# Patient Record
Sex: FEMALE | Race: WHITE | NOT HISPANIC OR LATINO
[De-identification: names, ages, dates, MRNs, and addresses within clinical notes are randomized per-mention and may not be internally consistent; named-entity substitution may affect disease eponyms.]

---

## 2017-02-01 ENCOUNTER — RX RENEWAL (OUTPATIENT)
Age: 62
End: 2017-02-01

## 2017-02-13 ENCOUNTER — NON-APPOINTMENT (OUTPATIENT)
Age: 62
End: 2017-02-13

## 2017-02-13 ENCOUNTER — APPOINTMENT (OUTPATIENT)
Dept: CARDIOLOGY | Facility: CLINIC | Age: 62
End: 2017-02-13

## 2017-02-13 VITALS
WEIGHT: 165 LBS | BODY MASS INDEX: 28.17 KG/M2 | HEIGHT: 64 IN | SYSTOLIC BLOOD PRESSURE: 144 MMHG | DIASTOLIC BLOOD PRESSURE: 75 MMHG

## 2017-02-13 VITALS
HEIGHT: 64 IN | BODY MASS INDEX: 28.17 KG/M2 | HEART RATE: 70 BPM | RESPIRATION RATE: 15 BRPM | WEIGHT: 165 LBS | OXYGEN SATURATION: 98 % | SYSTOLIC BLOOD PRESSURE: 144 MMHG | DIASTOLIC BLOOD PRESSURE: 75 MMHG

## 2017-04-19 ENCOUNTER — RX RENEWAL (OUTPATIENT)
Age: 62
End: 2017-04-19

## 2017-06-12 ENCOUNTER — APPOINTMENT (OUTPATIENT)
Dept: CARDIOLOGY | Facility: CLINIC | Age: 62
End: 2017-06-12

## 2017-06-12 ENCOUNTER — NON-APPOINTMENT (OUTPATIENT)
Age: 62
End: 2017-06-12

## 2017-06-12 VITALS
SYSTOLIC BLOOD PRESSURE: 128 MMHG | OXYGEN SATURATION: 97 % | HEART RATE: 67 BPM | BODY MASS INDEX: 28.34 KG/M2 | WEIGHT: 166 LBS | HEIGHT: 64 IN | DIASTOLIC BLOOD PRESSURE: 81 MMHG

## 2017-08-04 ENCOUNTER — OTHER (OUTPATIENT)
Age: 62
End: 2017-08-04

## 2017-08-14 ENCOUNTER — OUTPATIENT (OUTPATIENT)
Dept: OUTPATIENT SERVICES | Facility: HOSPITAL | Age: 62
LOS: 1 days | End: 2017-08-14
Payer: COMMERCIAL

## 2017-08-14 ENCOUNTER — APPOINTMENT (OUTPATIENT)
Dept: CV DIAGNOSITCS | Facility: HOSPITAL | Age: 62
End: 2017-08-14

## 2017-08-14 DIAGNOSIS — I25.10 ATHEROSCLEROTIC HEART DISEASE OF NATIVE CORONARY ARTERY WITHOUT ANGINA PECTORIS: ICD-10-CM

## 2017-08-14 PROCEDURE — 93306 TTE W/DOPPLER COMPLETE: CPT

## 2017-08-14 PROCEDURE — 93306 TTE W/DOPPLER COMPLETE: CPT | Mod: 26

## 2017-10-16 ENCOUNTER — APPOINTMENT (OUTPATIENT)
Dept: CARDIOLOGY | Facility: CLINIC | Age: 62
End: 2017-10-16
Payer: COMMERCIAL

## 2017-10-16 ENCOUNTER — NON-APPOINTMENT (OUTPATIENT)
Age: 62
End: 2017-10-16

## 2017-10-16 VITALS
HEIGHT: 64 IN | HEART RATE: 81 BPM | BODY MASS INDEX: 28.34 KG/M2 | WEIGHT: 166 LBS | SYSTOLIC BLOOD PRESSURE: 126 MMHG | DIASTOLIC BLOOD PRESSURE: 72 MMHG | OXYGEN SATURATION: 99 %

## 2017-10-16 PROCEDURE — 99215 OFFICE O/P EST HI 40 MIN: CPT

## 2017-10-16 PROCEDURE — 93000 ELECTROCARDIOGRAM COMPLETE: CPT

## 2018-02-12 ENCOUNTER — NON-APPOINTMENT (OUTPATIENT)
Age: 63
End: 2018-02-12

## 2018-02-12 ENCOUNTER — APPOINTMENT (OUTPATIENT)
Dept: CARDIOLOGY | Facility: CLINIC | Age: 63
End: 2018-02-12
Payer: COMMERCIAL

## 2018-02-12 VITALS — SYSTOLIC BLOOD PRESSURE: 134 MMHG | DIASTOLIC BLOOD PRESSURE: 82 MMHG

## 2018-02-12 VITALS
BODY MASS INDEX: 28.34 KG/M2 | SYSTOLIC BLOOD PRESSURE: 136 MMHG | DIASTOLIC BLOOD PRESSURE: 91 MMHG | WEIGHT: 166 LBS | HEIGHT: 64 IN | HEART RATE: 64 BPM | OXYGEN SATURATION: 97 %

## 2018-02-12 VITALS — SYSTOLIC BLOOD PRESSURE: 132 MMHG | DIASTOLIC BLOOD PRESSURE: 85 MMHG

## 2018-02-12 PROCEDURE — 99215 OFFICE O/P EST HI 40 MIN: CPT

## 2018-02-12 PROCEDURE — 93000 ELECTROCARDIOGRAM COMPLETE: CPT

## 2018-07-16 ENCOUNTER — NON-APPOINTMENT (OUTPATIENT)
Age: 63
End: 2018-07-16

## 2018-07-16 ENCOUNTER — APPOINTMENT (OUTPATIENT)
Dept: CARDIOLOGY | Facility: CLINIC | Age: 63
End: 2018-07-16
Payer: COMMERCIAL

## 2018-07-16 VITALS — SYSTOLIC BLOOD PRESSURE: 157 MMHG | DIASTOLIC BLOOD PRESSURE: 93 MMHG

## 2018-07-16 VITALS — DIASTOLIC BLOOD PRESSURE: 85 MMHG | SYSTOLIC BLOOD PRESSURE: 153 MMHG

## 2018-07-16 VITALS — HEART RATE: 64 BPM | SYSTOLIC BLOOD PRESSURE: 156 MMHG | DIASTOLIC BLOOD PRESSURE: 82 MMHG | OXYGEN SATURATION: 98 %

## 2018-07-16 PROCEDURE — 93000 ELECTROCARDIOGRAM COMPLETE: CPT

## 2018-07-16 PROCEDURE — 99215 OFFICE O/P EST HI 40 MIN: CPT

## 2018-07-16 RX ORDER — GLIPIZIDE 5 MG/1
5 TABLET ORAL DAILY
Refills: 0 | Status: DISCONTINUED | COMMUNITY
Start: 2018-07-16 | End: 2018-07-16

## 2018-10-09 ENCOUNTER — RX RENEWAL (OUTPATIENT)
Age: 63
End: 2018-10-09

## 2018-11-12 ENCOUNTER — RX RENEWAL (OUTPATIENT)
Age: 63
End: 2018-11-12

## 2018-11-19 ENCOUNTER — NON-APPOINTMENT (OUTPATIENT)
Age: 63
End: 2018-11-19

## 2018-11-19 ENCOUNTER — APPOINTMENT (OUTPATIENT)
Dept: CARDIOLOGY | Facility: CLINIC | Age: 63
End: 2018-11-19
Payer: COMMERCIAL

## 2018-11-19 VITALS
HEART RATE: 65 BPM | OXYGEN SATURATION: 97 % | SYSTOLIC BLOOD PRESSURE: 147 MMHG | BODY MASS INDEX: 25.92 KG/M2 | DIASTOLIC BLOOD PRESSURE: 76 MMHG | WEIGHT: 151 LBS

## 2018-11-19 VITALS — DIASTOLIC BLOOD PRESSURE: 80 MMHG | SYSTOLIC BLOOD PRESSURE: 145 MMHG

## 2018-11-19 VITALS — SYSTOLIC BLOOD PRESSURE: 144 MMHG | DIASTOLIC BLOOD PRESSURE: 74 MMHG

## 2018-11-19 PROCEDURE — 99214 OFFICE O/P EST MOD 30 MIN: CPT

## 2018-11-19 PROCEDURE — 93000 ELECTROCARDIOGRAM COMPLETE: CPT

## 2018-11-19 RX ORDER — SITAGLIPTIN AND METFORMIN HYDROCHLORIDE 50; 500 MG/1; MG/1
50-500 TABLET, FILM COATED ORAL TWICE DAILY
Refills: 0 | Status: ACTIVE | COMMUNITY
Start: 2018-11-19

## 2018-11-19 RX ORDER — LISINOPRIL 5 MG/1
5 TABLET ORAL
Qty: 90 | Refills: 3 | Status: DISCONTINUED | COMMUNITY
Start: 2018-10-09 | End: 2018-11-19

## 2018-11-19 NOTE — DISCUSSION/SUMMARY
[Coronary Artery Disease] : coronary artery disease [Hyperlipidemia] : hyperlipidemia [Stable] : stable [None] : none [Essential Hypertension] : essential hypertension [Improving] : improving [Medication Changes Per Orders] : as documented in orders [Exercise Regimen] : an exercise regimen [Sodium Restriction] : sodium restriction [___ Month(s)] : [unfilled] month(s) [FreeTextEntry1] : remote MI without cardiovascular sx. reviewed blood work with pt.. slight increase in triglyceride and hgba1c.  spoke with pt about diet and exercise. take statin daily.\par no other cardiovascular events.\par \par 2/12/2018  remote MI without recurrent sx.  BP trend is a little elevated if remains borderline will increase lisinopril on next visit,\par \par 7/16/2018  no cardiac sx.  BP has gone up and will titrate lisinopril to 10 mg.  lipids in control.  needs to get diabetes under better control.\par \par 11/19/2018  diabetic medication changed.  BP is improving since last visit.  may consider change from metoprolol to Coreg on next visit to assist with bp management,.

## 2018-11-19 NOTE — HISTORY OF PRESENT ILLNESS
[FreeTextEntry1] : remote MI with elevated cholesterol and diabetes.  lipids and diabetes have been controlled and patient remains without cardiovasc sx.  no active complaints.\par walking daily\par \par pt told by PCP that sugar and cholesterol have been up recently.  she was skipping diabetes med and statin because she thought they were causing tinnitis.\par \par tchol  151\par LDL  75\par triglyc 214\par HDL  33\par \par [id number],4\par \par \par 6/12  no acute change in sx.  says that if she rushes or hurries she gets some mild chest discomfort  which resolves the moment she slows down.  \par \par 10/16/2017  no complaints today.  no chest pain, last echo consistent with prior studies mild segmental dysfunction.  reviewed bloods with patient.  \par \par 2/12/2018  no recent or acute sx.  echo reviewed. \par \par 7/16/2018 no new cardiac issues.  no chest pain, palpitations.  sugar has been high and was started on glipizide.  lipids are good except triglyceride.  explained need for diabetic control.\par \par 11/19/2018  pt saw endocrinologist and was started on Janumet instead of glipizide.  has been taking since July..  calling now to get blood work results.  no chest pain, sob, dyspnea.

## 2018-11-19 NOTE — PHYSICAL EXAM
[General Appearance - Well Developed] : well developed [Normal Appearance] : normal appearance [Well Groomed] : well groomed [General Appearance - Well Nourished] : well nourished [No Deformities] : no deformities [General Appearance - In No Acute Distress] : no acute distress [Normal Conjunctiva] : the conjunctiva exhibited no abnormalities [Eyelids - No Xanthelasma] : the eyelids demonstrated no xanthelasmas [Normal Oral Mucosa] : normal oral mucosa [No Oral Pallor] : no oral pallor [No Oral Cyanosis] : no oral cyanosis [Normal Jugular Venous A Waves Present] : normal jugular venous A waves present [Normal Jugular Venous V Waves Present] : normal jugular venous V waves present [No Jugular Venous Beasley A Waves] : no jugular venous beasley A waves [Respiration, Rhythm And Depth] : normal respiratory rhythm and effort [Exaggerated Use Of Accessory Muscles For Inspiration] : no accessory muscle use [Auscultation Breath Sounds / Voice Sounds] : lungs were clear to auscultation bilaterally [Normal] : normal [Normal Rate] : normal [Rhythm Regular] : regular [Normal S1] : normal S1 [Normal S2] : normal S2 [No Murmur] : no murmurs heard [No Abnormalities] : the abdominal aorta was not enlarged and no bruit was heard [No Pitting Edema] : no pitting edema present [Bowel Sounds] : normal bowel sounds [Abdomen Soft] : soft [Abdomen Tenderness] : non-tender [Abdomen Mass (___ Cm)] : no abdominal mass palpated [Abnormal Walk] : normal gait [Gait - Sufficient For Exercise Testing] : the gait was sufficient for exercise testing [Nail Clubbing] : no clubbing of the fingernails [Cyanosis, Localized] : no localized cyanosis [Petechial Hemorrhages (___cm)] : no petechial hemorrhages [Skin Color & Pigmentation] : normal skin color and pigmentation [] : no rash [No Venous Stasis] : no venous stasis [Skin Lesions] : no skin lesions [No Skin Ulcers] : no skin ulcer [No Xanthoma] : no  xanthoma was observed [Oriented To Time, Place, And Person] : oriented to person, place, and time [Affect] : the affect was normal [Mood] : the mood was normal [No Anxiety] : not feeling anxious [Right Carotid Bruit] : no bruit heard over the right carotid [Left Carotid Bruit] : no bruit heard over the left carotid [Bruit] : no bruit heard [Rt] : no varicose veins of the right leg [Lt] : no varicose veins of the left leg

## 2018-11-28 ENCOUNTER — MEDICATION RENEWAL (OUTPATIENT)
Age: 63
End: 2018-11-28

## 2019-03-25 ENCOUNTER — NON-APPOINTMENT (OUTPATIENT)
Age: 64
End: 2019-03-25

## 2019-03-25 ENCOUNTER — APPOINTMENT (OUTPATIENT)
Dept: CARDIOLOGY | Facility: CLINIC | Age: 64
End: 2019-03-25
Payer: COMMERCIAL

## 2019-03-25 VITALS — SYSTOLIC BLOOD PRESSURE: 114 MMHG | DIASTOLIC BLOOD PRESSURE: 75 MMHG

## 2019-03-25 VITALS — SYSTOLIC BLOOD PRESSURE: 129 MMHG | HEART RATE: 53 BPM | OXYGEN SATURATION: 97 % | DIASTOLIC BLOOD PRESSURE: 72 MMHG

## 2019-03-25 PROCEDURE — 93000 ELECTROCARDIOGRAM COMPLETE: CPT

## 2019-03-25 PROCEDURE — 99214 OFFICE O/P EST MOD 30 MIN: CPT

## 2019-03-25 NOTE — HISTORY OF PRESENT ILLNESS
[FreeTextEntry1] : remote MI with elevated cholesterol and diabetes.  lipids and diabetes have been controlled and patient remains without cardiovasc sx.  no active complaints.\par walking daily\par \par pt told by PCP that sugar and cholesterol have been up recently.  she was skipping diabetes med and statin because she thought they were causing tinnitis.\par \par tchol  151\par LDL  75\par triglyc 214\par HDL  33\par \par [id number],4\par \par \par 6/12  no acute change in sx.  says that if she rushes or hurries she gets some mild chest discomfort  which resolves the moment she slows down.  \par \par 10/16/2017  no complaints today.  no chest pain, last echo consistent with prior studies mild segmental dysfunction.  reviewed bloods with patient.  \par \par 2/12/2018  no recent or acute sx.  echo reviewed. \par \par 7/16/2018 no new cardiac issues.  no chest pain, palpitations.  sugar has been high and was started on glipizide.  lipids are good except triglyceride.  explained need for diabetic control.\par \par 11/19/2018  pt saw endocrinologist and was started on Janumet instead of glipizide.  has been taking since July..  calling now to get blood work results.  no chest pain, sob, dyspnea.\par \par 3/25/2019   had work up because of weight loss (20 lbs) workup was normal.  no chest pain.  no new cardiac sx.

## 2019-03-25 NOTE — DISCUSSION/SUMMARY
[Coronary Artery Disease] : coronary artery disease [Stable] : stable [Essential Hypertension] : essential hypertension [Improving] : improving [None] : none [___ Month(s)] : [unfilled] month(s) [FreeTextEntry1] : remote MI without cardiovascular sx. reviewed blood work with pt.. slight increase in triglyceride and hgba1c.  spoke with pt about diet and exercise. take statin daily.\par no other cardiovascular events.\par \par 2/12/2018  remote MI without recurrent sx.  BP trend is a little elevated if remains borderline will increase lisinopril on next visit,\par \par 7/16/2018  no cardiac sx.  BP has gone up and will titrate lisinopril to 10 mg.  lipids in control.  needs to get diabetes under better control.\par \par 11/19/2018  diabetic medication changed.  BP is improving since last visit.  may consider change from metoprolol to Coreg on next visit to assist with bp management,.\par \par 3/25/2019  BP well controlled. diabetic control is better.  no new medical issues.  maintain current tx,.  remote MI with stent.  htn and hyperlipidemia.

## 2019-03-25 NOTE — PHYSICAL EXAM
[General Appearance - Well Developed] : well developed [Normal Appearance] : normal appearance [Well Groomed] : well groomed [General Appearance - Well Nourished] : well nourished [No Deformities] : no deformities [General Appearance - In No Acute Distress] : no acute distress [Normal Conjunctiva] : the conjunctiva exhibited no abnormalities [Eyelids - No Xanthelasma] : the eyelids demonstrated no xanthelasmas [Normal Oral Mucosa] : normal oral mucosa [No Oral Pallor] : no oral pallor [No Oral Cyanosis] : no oral cyanosis [Normal Jugular Venous A Waves Present] : normal jugular venous A waves present [Normal Jugular Venous V Waves Present] : normal jugular venous V waves present [No Jugular Venous Beasley A Waves] : no jugular venous beasley A waves [Respiration, Rhythm And Depth] : normal respiratory rhythm and effort [Exaggerated Use Of Accessory Muscles For Inspiration] : no accessory muscle use [Auscultation Breath Sounds / Voice Sounds] : lungs were clear to auscultation bilaterally [Normal] : normal [Normal Rate] : normal [Rhythm Regular] : regular [Normal S1] : normal S1 [Normal S2] : normal S2 [No Murmur] : no murmurs heard [Right Carotid Bruit] : no bruit heard over the right carotid [Left Carotid Bruit] : no bruit heard over the left carotid [No Abnormalities] : the abdominal aorta was not enlarged and no bruit was heard [Bruit] : no bruit heard [No Pitting Edema] : no pitting edema present [Rt] : no varicose veins of the right leg [Lt] : no varicose veins of the left leg [Bowel Sounds] : normal bowel sounds [Abdomen Soft] : soft [Abdomen Tenderness] : non-tender [Abdomen Mass (___ Cm)] : no abdominal mass palpated [Abnormal Walk] : normal gait [Gait - Sufficient For Exercise Testing] : the gait was sufficient for exercise testing [Nail Clubbing] : no clubbing of the fingernails [Cyanosis, Localized] : no localized cyanosis [Petechial Hemorrhages (___cm)] : no petechial hemorrhages [Skin Color & Pigmentation] : normal skin color and pigmentation [] : no rash [No Venous Stasis] : no venous stasis [Skin Lesions] : no skin lesions [No Skin Ulcers] : no skin ulcer [No Xanthoma] : no  xanthoma was observed [Oriented To Time, Place, And Person] : oriented to person, place, and time [Affect] : the affect was normal [Mood] : the mood was normal [No Anxiety] : not feeling anxious

## 2019-07-31 ENCOUNTER — RX RENEWAL (OUTPATIENT)
Age: 64
End: 2019-07-31

## 2019-08-26 ENCOUNTER — APPOINTMENT (OUTPATIENT)
Dept: CARDIOLOGY | Facility: CLINIC | Age: 64
End: 2019-08-26

## 2019-09-30 ENCOUNTER — RX RENEWAL (OUTPATIENT)
Age: 64
End: 2019-09-30

## 2019-11-11 ENCOUNTER — NON-APPOINTMENT (OUTPATIENT)
Age: 64
End: 2019-11-11

## 2019-11-11 ENCOUNTER — APPOINTMENT (OUTPATIENT)
Dept: CARDIOLOGY | Facility: CLINIC | Age: 64
End: 2019-11-11
Payer: COMMERCIAL

## 2019-11-11 VITALS
DIASTOLIC BLOOD PRESSURE: 71 MMHG | WEIGHT: 138 LBS | SYSTOLIC BLOOD PRESSURE: 132 MMHG | OXYGEN SATURATION: 97 % | HEIGHT: 64 IN | BODY MASS INDEX: 23.56 KG/M2 | HEART RATE: 63 BPM

## 2019-11-11 DIAGNOSIS — E87.5 HYPERKALEMIA: ICD-10-CM

## 2019-11-11 PROCEDURE — 93000 ELECTROCARDIOGRAM COMPLETE: CPT

## 2019-11-11 PROCEDURE — 99214 OFFICE O/P EST MOD 30 MIN: CPT

## 2019-11-11 NOTE — DISCUSSION/SUMMARY
[Coronary Artery Disease] : coronary artery disease [Hyperlipidemia] : hyperlipidemia [None] : none [Stable] : stable [FreeTextEntry4] : aortic regurg [___ Month(s)] : [unfilled] month(s) [FreeTextEntry1] : remote MI without cardiovascular sx. reviewed blood work with pt.. slight increase in triglyceride and hgba1c.  spoke with pt about diet and exercise. take statin daily.\par no other cardiovascular events.\par \par 2/12/2018  remote MI without recurrent sx.  BP trend is a little elevated if remains borderline will increase lisinopril on next visit,\par \par 7/16/2018  no cardiac sx.  BP has gone up and will titrate lisinopril to 10 mg.  lipids in control.  needs to get diabetes under better control.\par \par 11/19/2018  diabetic medication changed.  BP is improving since last visit.  may consider change from metoprolol to Coreg on next visit to assist with bp management,.\par \par 3/25/2019  BP well controlled. diabetic control is better.  no new medical issues.  maintain current tx,.  remote MI with stent.  htn and hyperlipidemia.\par \par 11/11/2019  no acute complaints.  BP is controlled.  surveillance echo for moderate aortic insuff,.

## 2019-11-11 NOTE — PHYSICAL EXAM
[General Appearance - Well Developed] : well developed [General Appearance - Well Nourished] : well nourished [Normal Appearance] : normal appearance [Well Groomed] : well groomed [General Appearance - In No Acute Distress] : no acute distress [No Deformities] : no deformities [Normal Conjunctiva] : the conjunctiva exhibited no abnormalities [Eyelids - No Xanthelasma] : the eyelids demonstrated no xanthelasmas [No Oral Pallor] : no oral pallor [Normal Oral Mucosa] : normal oral mucosa [No Oral Cyanosis] : no oral cyanosis [Normal Jugular Venous A Waves Present] : normal jugular venous A waves present [No Jugular Venous Beasley A Waves] : no jugular venous beasley A waves [Normal Jugular Venous V Waves Present] : normal jugular venous V waves present [Auscultation Breath Sounds / Voice Sounds] : lungs were clear to auscultation bilaterally [Exaggerated Use Of Accessory Muscles For Inspiration] : no accessory muscle use [Respiration, Rhythm And Depth] : normal respiratory rhythm and effort [Normal Rate] : normal [Normal] : normal [Normal S1] : normal S1 [Rhythm Regular] : regular [No Murmur] : no murmurs heard [Right Carotid Bruit] : no bruit heard over the right carotid [Normal S2] : normal S2 [Left Carotid Bruit] : no bruit heard over the left carotid [Rt] : no varicose veins of the right leg [No Pitting Edema] : no pitting edema present [No Abnormalities] : the abdominal aorta was not enlarged and no bruit was heard [Lt] : no varicose veins of the left leg [Bowel Sounds] : normal bowel sounds [Abdomen Soft] : soft [Abdomen Tenderness] : non-tender [Abdomen Mass (___ Cm)] : no abdominal mass palpated [Abnormal Walk] : normal gait [Gait - Sufficient For Exercise Testing] : the gait was sufficient for exercise testing [Nail Clubbing] : no clubbing of the fingernails [Petechial Hemorrhages (___cm)] : no petechial hemorrhages [Cyanosis, Localized] : no localized cyanosis [Skin Color & Pigmentation] : normal skin color and pigmentation [] : no rash [No Venous Stasis] : no venous stasis [No Xanthoma] : no  xanthoma was observed [No Skin Ulcers] : no skin ulcer [Skin Lesions] : no skin lesions [Affect] : the affect was normal [Oriented To Time, Place, And Person] : oriented to person, place, and time [Mood] : the mood was normal [No Anxiety] : not feeling anxious

## 2019-11-11 NOTE — HISTORY OF PRESENT ILLNESS
[FreeTextEntry1] : remote MI with elevated cholesterol and diabetes.  lipids and diabetes have been controlled and patient remains without cardiovasc sx.  no active complaints.\par walking daily\par \par pt told by PCP that sugar and cholesterol have been up recently.  she was skipping diabetes med and statin because she thought they were causing tinnitis.\par \par tchol  151\par LDL  75\par triglyc 214\par HDL  33\par \par [id number],4\par \par \par 6/12  no acute change in sx.  says that if she rushes or hurries she gets some mild chest discomfort  which resolves the moment she slows down.  \par \par 10/16/2017  no complaints today.  no chest pain, last echo consistent with prior studies mild segmental dysfunction.  reviewed bloods with patient.  \par \par 2/12/2018  no recent or acute sx.  echo reviewed. \par \par 7/16/2018 no new cardiac issues.  no chest pain, palpitations.  sugar has been high and was started on glipizide.  lipids are good except triglyceride.  explained need for diabetic control.\par \par 11/19/2018  pt saw endocrinologist and was started on Janumet instead of glipizide.  has been taking since July..  calling now to get blood work results.  no chest pain, sob, dyspnea.\par \par 3/25/2019   had work up because of weight loss (20 lbs) workup was normal.  no chest pain.  no new cardiac sx.\par \par 11/11/2019  no complains today.  no chest pain. dyspnea or edema.

## 2019-11-11 NOTE — REASON FOR VISIT
[Follow-Up - Clinic] : a clinic follow-up of [Coronary Artery Disease] : coronary artery disease [Hyperlipidemia] : hyperlipidemia [Hypertension] : hypertension [FreeTextEntry1] : aortic regurg

## 2019-11-13 ENCOUNTER — RX RENEWAL (OUTPATIENT)
Age: 64
End: 2019-11-13

## 2019-12-16 ENCOUNTER — RX RENEWAL (OUTPATIENT)
Age: 64
End: 2019-12-16

## 2020-12-14 ENCOUNTER — NON-APPOINTMENT (OUTPATIENT)
Age: 65
End: 2020-12-14

## 2020-12-14 ENCOUNTER — APPOINTMENT (OUTPATIENT)
Dept: CARDIOLOGY | Facility: CLINIC | Age: 65
End: 2020-12-14
Payer: MEDICARE

## 2020-12-14 VITALS
WEIGHT: 148 LBS | HEART RATE: 66 BPM | SYSTOLIC BLOOD PRESSURE: 131 MMHG | BODY MASS INDEX: 25.27 KG/M2 | HEIGHT: 64 IN | DIASTOLIC BLOOD PRESSURE: 75 MMHG | OXYGEN SATURATION: 98 %

## 2020-12-14 PROCEDURE — 99214 OFFICE O/P EST MOD 30 MIN: CPT

## 2020-12-14 PROCEDURE — 93000 ELECTROCARDIOGRAM COMPLETE: CPT

## 2020-12-14 NOTE — HISTORY OF PRESENT ILLNESS
[FreeTextEntry1] : remote MI with elevated cholesterol and diabetes.  lipids and diabetes have been controlled and patient remains without cardiovasc sx.  no active complaints.\par walking daily\par \par pt told by PCP that sugar and cholesterol have been up recently.  she was skipping diabetes med and statin because she thought they were causing tinnitis.\par \par tchol  151\par LDL  75\par triglyc 214\par HDL  33\par \par [id number],4\par \par \par 6/12  no acute change in sx.  says that if she rushes or hurries she gets some mild chest discomfort  which resolves the moment she slows down.  \par \par 10/16/2017  no complaints today.  no chest pain, last echo consistent with prior studies mild segmental dysfunction.  reviewed bloods with patient.  \par \par 2/12/2018  no recent or acute sx.  echo reviewed. \par \par 7/16/2018 no new cardiac issues.  no chest pain, palpitations.  sugar has been high and was started on glipizide.  lipids are good except triglyceride.  explained need for diabetic control.\par \par 11/19/2018  pt saw endocrinologist and was started on Janumet instead of glipizide.  has been taking since July..  calling now to get blood work results.  no chest pain, sob, dyspnea.\par \par 3/25/2019   had work up because of weight loss (20 lbs) workup was normal.  no chest pain.  no new cardiac sx.\par \par 11/11/2019  no complains today.  no chest pain. dyspnea or edema.  \par \par 12/14/2020  no chest pain. dyspnea or edema.  reminded to get a flu shot, but says she probably wont.

## 2020-12-14 NOTE — DISCUSSION/SUMMARY
[Coronary Artery Disease] : coronary artery disease [Hyperlipidemia] : hyperlipidemia [Essential Hypertension] : essential hypertension [Stable] : stable [None] : none [___ Month(s)] : [unfilled] month(s) [FreeTextEntry1] : remote MI without cardiovascular sx. reviewed blood work with pt.. slight increase in triglyceride and hgba1c.  spoke with pt about diet and exercise. take statin daily.\par no other cardiovascular events.\par \par 2/12/2018  remote MI without recurrent sx.  BP trend is a little elevated if remains borderline will increase lisinopril on next visit,\par \par 7/16/2018  no cardiac sx.  BP has gone up and will titrate lisinopril to 10 mg.  lipids in control.  needs to get diabetes under better control.\par \par 11/19/2018  diabetic medication changed.  BP is improving since last visit.  may consider change from metoprolol to Coreg on next visit to assist with bp management,.\par \par 3/25/2019  BP well controlled. diabetic control is better.  no new medical issues.  maintain current tx,.  remote MI with stent.  htn and hyperlipidemia.\par \par \par 12/14/2020  BP well controlled.  no sx.  needs lipid panel.  surveilace echo.  remote MI.

## 2020-12-14 NOTE — PHYSICAL EXAM
[General Appearance - Well Developed] : well developed [Normal Appearance] : normal appearance [Well Groomed] : well groomed [General Appearance - Well Nourished] : well nourished [No Deformities] : no deformities [General Appearance - In No Acute Distress] : no acute distress [Normal Conjunctiva] : the conjunctiva exhibited no abnormalities [Eyelids - No Xanthelasma] : the eyelids demonstrated no xanthelasmas [Normal Oral Mucosa] : normal oral mucosa [No Oral Pallor] : no oral pallor [No Oral Cyanosis] : no oral cyanosis [Normal Jugular Venous A Waves Present] : normal jugular venous A waves present [Normal Jugular Venous V Waves Present] : normal jugular venous V waves present [No Jugular Venous Beasley A Waves] : no jugular venous beasley A waves [Respiration, Rhythm And Depth] : normal respiratory rhythm and effort [Exaggerated Use Of Accessory Muscles For Inspiration] : no accessory muscle use [Auscultation Breath Sounds / Voice Sounds] : lungs were clear to auscultation bilaterally [Normal] : normal [Normal Rate] : normal [Rhythm Regular] : regular [Normal S1] : normal S1 [Normal S2] : normal S2 [No Murmur] : no murmurs heard [Right Carotid Bruit] : no bruit heard over the right carotid [Left Carotid Bruit] : no bruit heard over the left carotid [No Abnormalities] : the abdominal aorta was not enlarged and no bruit was heard [No Pitting Edema] : no pitting edema present [Bowel Sounds] : normal bowel sounds [Abdomen Soft] : soft [Abdomen Tenderness] : non-tender [Abdomen Mass (___ Cm)] : no abdominal mass palpated [Abnormal Walk] : normal gait [Gait - Sufficient For Exercise Testing] : the gait was sufficient for exercise testing [Nail Clubbing] : no clubbing of the fingernails [Cyanosis, Localized] : no localized cyanosis [Petechial Hemorrhages (___cm)] : no petechial hemorrhages [Skin Color & Pigmentation] : normal skin color and pigmentation [] : no rash [No Venous Stasis] : no venous stasis [Skin Lesions] : no skin lesions [No Skin Ulcers] : no skin ulcer [No Xanthoma] : no  xanthoma was observed [Oriented To Time, Place, And Person] : oriented to person, place, and time [Affect] : the affect was normal [Mood] : the mood was normal [No Anxiety] : not feeling anxious

## 2021-01-14 ENCOUNTER — OUTPATIENT (OUTPATIENT)
Dept: OUTPATIENT SERVICES | Facility: HOSPITAL | Age: 66
LOS: 1 days | End: 2021-01-14
Payer: COMMERCIAL

## 2021-01-14 ENCOUNTER — APPOINTMENT (OUTPATIENT)
Dept: CV DIAGNOSITCS | Facility: HOSPITAL | Age: 66
End: 2021-01-14

## 2021-01-14 DIAGNOSIS — I10 ESSENTIAL (PRIMARY) HYPERTENSION: ICD-10-CM

## 2021-01-14 DIAGNOSIS — I25.10 ATHEROSCLEROTIC HEART DISEASE OF NATIVE CORONARY ARTERY WITHOUT ANGINA PECTORIS: ICD-10-CM

## 2021-01-14 PROCEDURE — 93306 TTE W/DOPPLER COMPLETE: CPT | Mod: 26

## 2021-01-14 PROCEDURE — 93306 TTE W/DOPPLER COMPLETE: CPT

## 2021-04-19 ENCOUNTER — APPOINTMENT (OUTPATIENT)
Dept: CARDIOLOGY | Facility: CLINIC | Age: 66
End: 2021-04-19
Payer: COMMERCIAL

## 2021-04-19 ENCOUNTER — NON-APPOINTMENT (OUTPATIENT)
Age: 66
End: 2021-04-19

## 2021-04-19 VITALS — OXYGEN SATURATION: 98 % | WEIGHT: 148 LBS | HEIGHT: 64 IN | HEART RATE: 60 BPM | BODY MASS INDEX: 25.27 KG/M2

## 2021-04-19 VITALS — OXYGEN SATURATION: 98 % | HEART RATE: 60 BPM | DIASTOLIC BLOOD PRESSURE: 80 MMHG | SYSTOLIC BLOOD PRESSURE: 144 MMHG

## 2021-04-19 VITALS — DIASTOLIC BLOOD PRESSURE: 80 MMHG | SYSTOLIC BLOOD PRESSURE: 130 MMHG

## 2021-04-19 PROCEDURE — 99214 OFFICE O/P EST MOD 30 MIN: CPT

## 2021-04-19 PROCEDURE — 93000 ELECTROCARDIOGRAM COMPLETE: CPT

## 2021-04-19 NOTE — HISTORY OF PRESENT ILLNESS
[FreeTextEntry1] : remote MI with elevated cholesterol and diabetes.  lipids and diabetes have been controlled and patient remains without cardiovasc sx.  no active complaints.\par walking daily\par \par pt told by PCP that sugar and cholesterol have been up recently.  she was skipping diabetes med and statin because she thought they were causing tinnitis.\par \par tchol  151\par LDL  75\par triglyc 214\par HDL  33\par \par [id number],4\par \par \par 6/12  no acute change in sx.  says that if she rushes or hurries she gets some mild chest discomfort  which resolves the moment she slows down.  \par \par 10/16/2017  no complaints today.  no chest pain, last echo consistent with prior studies mild segmental dysfunction.  reviewed bloods with patient.  \par \par 2/12/2018  no recent or acute sx.  echo reviewed. \par \par 7/16/2018 no new cardiac issues.  no chest pain, palpitations.  sugar has been high and was started on glipizide.  lipids are good except triglyceride.  explained need for diabetic control.\par \par 11/19/2018  pt saw endocrinologist and was started on Janumet instead of glipizide.  has been taking since July..  calling now to get blood work results.  no chest pain, sob, dyspnea.\par \par 3/25/2019   had work up because of weight loss (20 lbs) workup was normal.  no chest pain.  no new cardiac sx.\par \par 11/11/2019  no complains today.  no chest pain. dyspnea or edema.  \par \par 12/14/2020  no chest pain. dyspnea or edema.  reminded to get a flu shot, but says she probably wont.\par \par 4/19/2021  no new sx.  echo reviewed.  preserved LVEF with moderate AI.  no interval change.  has not gone for Covid vaccine.

## 2021-04-19 NOTE — PHYSICAL EXAM
[General Appearance - Well Developed] : well developed [Normal Appearance] : normal appearance [Well Groomed] : well groomed [General Appearance - Well Nourished] : well nourished [No Deformities] : no deformities [General Appearance - In No Acute Distress] : no acute distress [Normal Conjunctiva] : the conjunctiva exhibited no abnormalities [Eyelids - No Xanthelasma] : the eyelids demonstrated no xanthelasmas [Normal Oral Mucosa] : normal oral mucosa [No Oral Pallor] : no oral pallor [No Oral Cyanosis] : no oral cyanosis [Normal Jugular Venous A Waves Present] : normal jugular venous A waves present [Normal Jugular Venous V Waves Present] : normal jugular venous V waves present [No Jugular Venous Beasley A Waves] : no jugular venous beasley A waves [Respiration, Rhythm And Depth] : normal respiratory rhythm and effort [Exaggerated Use Of Accessory Muscles For Inspiration] : no accessory muscle use [Auscultation Breath Sounds / Voice Sounds] : lungs were clear to auscultation bilaterally [Heart Rate And Rhythm] : heart rate and rhythm were normal [Heart Sounds] : normal S1 and S2 [Murmurs] : no murmurs present [Abdomen Soft] : soft [Bowel Sounds] : normal bowel sounds [Abdomen Tenderness] : non-tender [Abdomen Mass (___ Cm)] : no abdominal mass palpated [Abnormal Walk] : normal gait [Gait - Sufficient For Exercise Testing] : the gait was sufficient for exercise testing [Nail Clubbing] : no clubbing of the fingernails [Cyanosis, Localized] : no localized cyanosis [Petechial Hemorrhages (___cm)] : no petechial hemorrhages [Skin Color & Pigmentation] : normal skin color and pigmentation [] : no rash [No Venous Stasis] : no venous stasis [Skin Lesions] : no skin lesions [No Skin Ulcers] : no skin ulcer [No Xanthoma] : no  xanthoma was observed [Oriented To Time, Place, And Person] : oriented to person, place, and time [Affect] : the affect was normal [Mood] : the mood was normal [No Anxiety] : not feeling anxious

## 2021-04-19 NOTE — DISCUSSION/SUMMARY
[Coronary Artery Disease] : coronary artery disease [Hyperlipidemia] : hyperlipidemia [Essential Hypertension] : essential hypertension [Stable] : stable [None] : none [___ Month(s)] : [unfilled] month(s) [FreeTextEntry1] : remote MI without cardiovascular sx. reviewed blood work with pt.. slight increase in triglyceride and hgba1c.  spoke with pt about diet and exercise. take statin daily.\par no other cardiovascular events.\par \par 2/12/2018  remote MI without recurrent sx.  BP trend is a little elevated if remains borderline will increase lisinopril on next visit,\par \par 7/16/2018  no cardiac sx.  BP has gone up and will titrate lisinopril to 10 mg.  lipids in control.  needs to get diabetes under better control.\par \par 11/19/2018  diabetic medication changed.  BP is improving since last visit.  may consider change from metoprolol to Coreg on next visit to assist with bp management,.\par \par 3/25/2019  BP well controlled. diabetic control is better.  no new medical issues.  maintain current tx,.  remote MI with stent.  htn and hyperlipidemia.\par \par 4/202/2021  no new issues.  BP is controlled.  no sx of angina or heart failure.   echo shows stable AI.  renew meds.

## 2021-08-16 ENCOUNTER — APPOINTMENT (OUTPATIENT)
Dept: CARDIOLOGY | Facility: CLINIC | Age: 66
End: 2021-08-16
Payer: COMMERCIAL

## 2021-08-16 ENCOUNTER — NON-APPOINTMENT (OUTPATIENT)
Age: 66
End: 2021-08-16

## 2021-08-16 VITALS
SYSTOLIC BLOOD PRESSURE: 127 MMHG | OXYGEN SATURATION: 98 % | BODY MASS INDEX: 24.24 KG/M2 | HEART RATE: 63 BPM | WEIGHT: 142 LBS | DIASTOLIC BLOOD PRESSURE: 78 MMHG | HEIGHT: 64 IN

## 2021-08-16 PROCEDURE — 99214 OFFICE O/P EST MOD 30 MIN: CPT

## 2021-08-16 PROCEDURE — 93000 ELECTROCARDIOGRAM COMPLETE: CPT

## 2021-08-16 NOTE — DISCUSSION/SUMMARY
[Coronary Artery Disease] : coronary artery disease [Stable] : stable [Hyperlipidemia] : hyperlipidemia [Essential Hypertension] : essential hypertension [Improving] : improving [None] : There are no changes in medication management [___ Month(s)] : in [unfilled] month(s) [FreeTextEntry4] : aortic regurgitation [FreeTextEntry1] : remote MI without cardiovascular sx. reviewed blood work with pt.. slight increase in triglyceride and hgba1c.  spoke with pt about diet and exercise. take statin daily.\par no other cardiovascular events.\par \par 2/12/2018  remote MI without recurrent sx.  BP trend is a little elevated if remains borderline will increase lisinopril on next visit,\par \par 7/16/2018  no cardiac sx.  BP has gone up and will titrate lisinopril to 10 mg.  lipids in control.  needs to get diabetes under better control.\par \par 11/19/2018  diabetic medication changed.  BP is improving since last visit.  may consider change from metoprolol to Coreg on next visit to assist with bp management,.\par \par 3/25/2019  BP well controlled. diabetic control is better.  no new medical issues.  maintain current tx,.  remote MI with stent.  htn and hyperlipidemia.\par \par 4/202/2021  no new issues.  BP is controlled.  no sx of angina or heart failure.   echo shows stable AI.  renew meds.\par \par 8/16/2021  remains asymptomatic.  moderate AI.  CAD.  BP is well controlled.  lipids at goal.  again long discussion about getting covid vaccine.

## 2021-08-16 NOTE — CARDIOLOGY SUMMARY
[de-identified] : Sinus  Rhythm \par Low voltage in precordial leads. \par  -Anterior infarct -age undetermined. \par  -  T-abnormality  -Possible  Anterolateral and inferior  ischemia  -consider inferior infarct (age undetermined). \par \par ABNORMAL

## 2021-12-20 ENCOUNTER — NON-APPOINTMENT (OUTPATIENT)
Age: 66
End: 2021-12-20

## 2021-12-20 ENCOUNTER — APPOINTMENT (OUTPATIENT)
Dept: CARDIOLOGY | Facility: CLINIC | Age: 66
End: 2021-12-20
Payer: COMMERCIAL

## 2021-12-20 VITALS — DIASTOLIC BLOOD PRESSURE: 71 MMHG | HEART RATE: 62 BPM | OXYGEN SATURATION: 100 % | SYSTOLIC BLOOD PRESSURE: 118 MMHG

## 2021-12-20 PROCEDURE — 99214 OFFICE O/P EST MOD 30 MIN: CPT

## 2021-12-20 PROCEDURE — 93000 ELECTROCARDIOGRAM COMPLETE: CPT

## 2021-12-20 NOTE — HISTORY OF PRESENT ILLNESS
[FreeTextEntry1] : remote MI with elevated cholesterol and diabetes.  lipids and diabetes have been controlled and patient remains without cardiovasc sx.  no active complaints.\par walking daily\par \par pt told by PCP that sugar and cholesterol have been up recently.  she was skipping diabetes med and statin because she thought they were causing tinnitis.\par \par tchol  151\par LDL  75\par triglyc 214\par HDL  33\par \par [id number],4\par \par \par 6/12  no acute change in sx.  says that if she rushes or hurries she gets some mild chest discomfort  which resolves the moment she slows down.  \par \par 10/16/2017  no complaints today.  no chest pain, last echo consistent with prior studies mild segmental dysfunction.  reviewed bloods with patient.  \par \par 2/12/2018  no recent or acute sx.  echo reviewed. \par \par 7/16/2018 no new cardiac issues.  no chest pain, palpitations.  sugar has been high and was started on glipizide.  lipids are good except triglyceride.  explained need for diabetic control.\par \par 11/19/2018  pt saw endocrinologist and was started on Janumet instead of glipizide.  has been taking since July..  calling now to get blood work results.  no chest pain, sob, dyspnea.\par \par 3/25/2019   had work up because of weight loss (20 lbs) workup was normal.  no chest pain.  no new cardiac sx.\par \par 11/11/2019  no complains today.  no chest pain. dyspnea or edema.  \par \par 12/14/2020  no chest pain. dyspnea or edema.  reminded to get a flu shot, but says she probably wont.\par \par 4/19/2021  no new sx.  echo reviewed.  preserved LVEF with moderate AI.  no interval change.  has not gone for Covid vaccine.  \par \par 8/16/2021  no new sx.  still not vaccinated.  denies chest pain, dyspnea.\par \par 12/20/2021 no new sx.   labs reviewed.  cholesterol and LDL are mildly increased.

## 2021-12-20 NOTE — CARDIOLOGY SUMMARY
[de-identified] : Sinus  Rhythm  -Short [place] syndrome \par Kori = 116\par Low voltage in precordial leads. \par  \par  -  Negative T-waves  -Possible  Inferior  ischemia  -consider inferior infarct (age undetermined). \par \par ABNORMAL

## 2021-12-20 NOTE — DISCUSSION/SUMMARY
[Coronary Artery Disease] : coronary artery disease [Hyperlipidemia] : hyperlipidemia [Diet Modification] : diet modification [Exercise] : exercise [Essential Hypertension] : essential hypertension [Stable] : stable [None] : There are no changes in medication management [___ Month(s)] : in [unfilled] month(s) [FreeTextEntry1] : remote MI without cardiovascular sx. reviewed blood work with pt.. slight increase in triglyceride and hgba1c.  spoke with pt about diet and exercise. take statin daily.\par no other cardiovascular events.\par \par 2/12/2018  remote MI without recurrent sx.  BP trend is a little elevated if remains borderline will increase lisinopril on next visit,\par \par 7/16/2018  no cardiac sx.  BP has gone up and will titrate lisinopril to 10 mg.  lipids in control.  needs to get diabetes under better control.\par \par 11/19/2018  diabetic medication changed.  BP is improving since last visit.  may consider change from metoprolol to Coreg on next visit to assist with bp management,.\par \par 3/25/2019  BP well controlled. diabetic control is better.  no new medical issues.  maintain current tx,.  remote MI with stent.  htn and hyperlipidemia.\par \par 4/202/2021  no new issues.  BP is controlled.  no sx of angina or heart failure.   echo shows stable AI.  renew meds.\par \par 12/202/2021  no new sx.  BP is well controlled.  mild increase in tchol and LDL .. discussed re evaluation of diet... repeat in 4 months..  moderate AI is stable.

## 2022-04-29 ENCOUNTER — NON-APPOINTMENT (OUTPATIENT)
Age: 67
End: 2022-04-29

## 2022-04-29 ENCOUNTER — APPOINTMENT (OUTPATIENT)
Dept: CARDIOLOGY | Facility: CLINIC | Age: 67
End: 2022-04-29
Payer: MEDICARE

## 2022-04-29 VITALS
HEART RATE: 62 BPM | BODY MASS INDEX: 22.88 KG/M2 | OXYGEN SATURATION: 98 % | HEIGHT: 64 IN | SYSTOLIC BLOOD PRESSURE: 131 MMHG | WEIGHT: 134 LBS | DIASTOLIC BLOOD PRESSURE: 77 MMHG

## 2022-04-29 PROCEDURE — 99214 OFFICE O/P EST MOD 30 MIN: CPT

## 2022-04-29 PROCEDURE — 93000 ELECTROCARDIOGRAM COMPLETE: CPT

## 2022-04-29 NOTE — DISCUSSION/SUMMARY
[Coronary Artery Disease] : coronary artery disease [Hyperlipidemia] : hyperlipidemia [Stable] : stable [Responding to Treatment] : responding to treatment [None] : There are no changes in medication management [Essential Hypertension] : essential hypertension [Improving] : improving [___ Month(s)] : in [unfilled] month(s) [FreeTextEntry4] : moderate aortic insuff [FreeTextEntry1] : remote MI without cardiovascular sx. reviewed blood work with pt.. slight increase in triglyceride and hgba1c.  spoke with pt about diet and exercise. take statin daily.\par no other cardiovascular events.\par \par 2/12/2018  remote MI without recurrent sx.  BP trend is a little elevated if remains borderline will increase lisinopril on next visit,\par \par 7/16/2018  no cardiac sx.  BP has gone up and will titrate lisinopril to 10 mg.  lipids in control.  needs to get diabetes under better control.\par \par 11/19/2018  diabetic medication changed.  BP is improving since last visit.  may consider change from metoprolol to Coreg on next visit to assist with bp management,.\par \par 3/25/2019  BP well controlled. diabetic control is better.  no new medical issues.  maintain current tx,.  remote MI with stent.  htn and hyperlipidemia.\par \par 4/202/2021  no new issues.  BP is controlled.  no sx of angina or heart failure.   echo shows stable AI.  renew meds.\par \par 4/29/2022  remains asymtonmatic.   BP controlled.  surveilance echo for AI and carotid doppler screening

## 2022-04-29 NOTE — CARDIOLOGY SUMMARY
[de-identified] : Sinus  Rhythm  -Short [place] syndrome \par Kori = 102\par Low voltage in precordial leads. \par  -Anterior infarct -age undetermined. \par  -  Negative T-waves  -Possible  Inferior  ischemia  -consider inferior infarct (age undetermined). \par

## 2022-04-29 NOTE — HISTORY OF PRESENT ILLNESS
[FreeTextEntry1] : remote MI with elevated cholesterol and diabetes.  lipids and diabetes have been controlled and patient remains without cardiovasc sx.  no active complaints.\par walking daily\par \par pt told by PCP that sugar and cholesterol have been up recently.  she was skipping diabetes med and statin because she thought they were causing tinnitis.\par \par tchol  151\par LDL  75\par triglyc 214\par HDL  33\par \par [id number],4\par \par \par 6/12  no acute change in sx.  says that if she rushes or hurries she gets some mild chest discomfort  which resolves the moment she slows down.  \par \par 10/16/2017  no complaints today.  no chest pain, last echo consistent with prior studies mild segmental dysfunction.  reviewed bloods with patient.  \par \par 2/12/2018  no recent or acute sx.  echo reviewed. \par \par 7/16/2018 no new cardiac issues.  no chest pain, palpitations.  sugar has been high and was started on glipizide.  lipids are good except triglyceride.  explained need for diabetic control.\par \par 11/19/2018  pt saw endocrinologist and was started on Janumet instead of glipizide.  has been taking since July..  calling now to get blood work results.  no chest pain, sob, dyspnea.\par \par 3/25/2019   had work up because of weight loss (20 lbs) workup was normal.  no chest pain.  no new cardiac sx.\par \par 11/11/2019  no complains today.  no chest pain. dyspnea or edema.  \par \par 12/14/2020  no chest pain. dyspnea or edema.  reminded to get a flu shot, but says she probably wont.\par \par 4/19/2021  no new sx.  echo reviewed.  preserved LVEF with moderate AI.  no interval change.  has not gone for Covid vaccine.  \par \par 8/16/2021  no new sx.  still not vaccinated.  denies chest pain, dyspnea.\par \par 12/20/2021 no new sx.   labs reviewed.  cholesterol and LDL are mildly increased.\par \par 4/29/2022  no new sx.  denies chest pain, dyspnea or edema.

## 2022-06-03 ENCOUNTER — RX RENEWAL (OUTPATIENT)
Age: 67
End: 2022-06-03

## 2022-06-22 ENCOUNTER — APPOINTMENT (OUTPATIENT)
Dept: ULTRASOUND IMAGING | Facility: CLINIC | Age: 67
End: 2022-06-22
Payer: MEDICARE

## 2022-06-22 ENCOUNTER — OUTPATIENT (OUTPATIENT)
Dept: OUTPATIENT SERVICES | Facility: HOSPITAL | Age: 67
LOS: 1 days | End: 2022-06-22
Payer: MEDICARE

## 2022-06-22 DIAGNOSIS — I10 ESSENTIAL (PRIMARY) HYPERTENSION: ICD-10-CM

## 2022-06-22 DIAGNOSIS — I25.10 ATHEROSCLEROTIC HEART DISEASE OF NATIVE CORONARY ARTERY WITHOUT ANGINA PECTORIS: ICD-10-CM

## 2022-06-22 PROCEDURE — 93880 EXTRACRANIAL BILAT STUDY: CPT | Mod: 26

## 2022-06-22 PROCEDURE — 93880 EXTRACRANIAL BILAT STUDY: CPT

## 2022-07-07 ENCOUNTER — APPOINTMENT (OUTPATIENT)
Dept: CARDIOLOGY | Facility: CLINIC | Age: 67
End: 2022-07-07

## 2022-07-07 DIAGNOSIS — E11.9 TYPE 2 DIABETES MELLITUS W/OUT COMPLICATIONS: ICD-10-CM

## 2022-07-07 PROCEDURE — 93306 TTE W/DOPPLER COMPLETE: CPT

## 2022-07-15 ENCOUNTER — NON-APPOINTMENT (OUTPATIENT)
Age: 67
End: 2022-07-15

## 2022-07-15 ENCOUNTER — APPOINTMENT (OUTPATIENT)
Dept: CARDIOLOGY | Facility: CLINIC | Age: 67
End: 2022-07-15

## 2022-07-15 VITALS
OXYGEN SATURATION: 98 % | BODY MASS INDEX: 22.88 KG/M2 | WEIGHT: 134 LBS | SYSTOLIC BLOOD PRESSURE: 123 MMHG | HEIGHT: 64 IN | DIASTOLIC BLOOD PRESSURE: 73 MMHG | HEART RATE: 66 BPM

## 2022-07-15 PROCEDURE — 93000 ELECTROCARDIOGRAM COMPLETE: CPT

## 2022-07-15 PROCEDURE — 99214 OFFICE O/P EST MOD 30 MIN: CPT

## 2022-07-15 NOTE — DISCUSSION/SUMMARY
[Coronary Artery Disease] : coronary artery disease [Hyperlipidemia] : hyperlipidemia [Stable] : stable [Essential Hypertension] : essential hypertension [Responding to Treatment] : responding to treatment [None] : There are no changes in medication management [___ Month(s)] : in [unfilled] month(s) [FreeTextEntry1] : remote MI without cardiovascular sx. reviewed blood work with pt.. slight increase in triglyceride and hgba1c.  spoke with pt about diet and exercise. take statin daily.\par no other cardiovascular events.\par \par 2/12/2018  remote MI without recurrent sx.  BP trend is a little elevated if remains borderline will increase lisinopril on next visit,\par \par 7/16/2018  no cardiac sx.  BP has gone up and will titrate lisinopril to 10 mg.  lipids in control.  needs to get diabetes under better control.\par \par 11/19/2018  diabetic medication changed.  BP is improving since last visit.  may consider change from metoprolol to Coreg on next visit to assist with bp management,.\par \par 3/25/2019  BP well controlled. diabetic control is better.  no new medical issues.  maintain current tx,.  remote MI with stent.  htn and hyperlipidemia.\par \par 4/202/2021  no new issues.  BP is controlled.  no sx of angina or heart failure.   echo shows stable AI.  renew meds.\par \par 7/15/2022 remains asymptomatic.  carotid with calcified non obstructive on right side.  repeat in 6 months.  normal lv function.  pt to  send me updated blood work.

## 2022-07-15 NOTE — CARDIOLOGY SUMMARY
[de-identified] : Sinus  Rhythm  -Short [place] syndrome \par Kori = 110\par Low voltage in precordial leads. \par prpwp\par  -  Negative T-waves  -Possible  Inferior  ischemia  -consider inferior infarct (age undetermined). \par

## 2022-07-15 NOTE — HISTORY OF PRESENT ILLNESS
[FreeTextEntry1] : remote MI with elevated cholesterol and diabetes.  lipids and diabetes have been controlled and patient remains without cardiovasc sx.  no active complaints.\par walking daily\par \par pt told by PCP that sugar and cholesterol have been up recently.  she was skipping diabetes med and statin because she thought they were causing tinnitis.\par \par tchol  151\par LDL  75\par triglyc 214\par HDL  33\par \par [id number],4\par \par \par 6/12  no acute change in sx.  says that if she rushes or hurries she gets some mild chest discomfort  which resolves the moment she slows down.  \par \par 10/16/2017  no complaints today.  no chest pain, last echo consistent with prior studies mild segmental dysfunction.  reviewed bloods with patient.  \par \par 2/12/2018  no recent or acute sx.  echo reviewed. \par \par 7/16/2018 no new cardiac issues.  no chest pain, palpitations.  sugar has been high and was started on glipizide.  lipids are good except triglyceride.  explained need for diabetic control.\par \par 11/19/2018  pt saw endocrinologist and was started on Janumet instead of glipizide.  has been taking since July..  calling now to get blood work results.  no chest pain, sob, dyspnea.\par \par 3/25/2019   had work up because of weight loss (20 lbs) workup was normal.  no chest pain.  no new cardiac sx.\par \par 11/11/2019  no complains today.  no chest pain. dyspnea or edema.  \par \par 12/14/2020  no chest pain. dyspnea or edema.  reminded to get a flu shot, but says she probably wont.\par \par 4/19/2021  no new sx.  echo reviewed.  preserved LVEF with moderate AI.  no interval change.  has not gone for Covid vaccine.  \par \par 8/16/2021  no new sx.  still not vaccinated.  denies chest pain, dyspnea.\par \par 12/20/2021 no new sx.   labs reviewed.  cholesterol and LDL are mildly increased.\par \par 4/29/2022  no new sx.  denies chest pain, dyspnea or edema.  \par \par 7/15/2022 denies sx.  reviewed echo with pt. normal systolic function.  carotid calcified non obstructive plaque

## 2022-11-21 ENCOUNTER — NON-APPOINTMENT (OUTPATIENT)
Age: 67
End: 2022-11-21

## 2022-11-21 ENCOUNTER — APPOINTMENT (OUTPATIENT)
Dept: CARDIOLOGY | Facility: CLINIC | Age: 67
End: 2022-11-21

## 2022-11-21 VITALS
HEIGHT: 64 IN | WEIGHT: 134 LBS | DIASTOLIC BLOOD PRESSURE: 75 MMHG | SYSTOLIC BLOOD PRESSURE: 126 MMHG | OXYGEN SATURATION: 100 % | BODY MASS INDEX: 22.88 KG/M2 | HEART RATE: 60 BPM

## 2022-11-21 PROCEDURE — 93000 ELECTROCARDIOGRAM COMPLETE: CPT

## 2022-11-21 PROCEDURE — 99214 OFFICE O/P EST MOD 30 MIN: CPT

## 2022-11-21 NOTE — CARDIOLOGY SUMMARY
[de-identified] : Sinus  Rhythm  -Short [place] syndrome \par Kori = 118\par Low voltage in precordial leads. \par  \par  -  Negative T-waves  -\par \par ABNORMAL \par

## 2022-11-21 NOTE — REASON FOR VISIT
[Symptom and Test Evaluation] : symptom and test evaluation [Hyperlipidemia] : hyperlipidemia [Hypertension] : hypertension [Coronary Artery Disease] : coronary artery disease [FreeTextEntry1] : \par \par

## 2022-11-21 NOTE — HISTORY OF PRESENT ILLNESS
[FreeTextEntry1] : remote MI with elevated cholesterol and diabetes.  lipids and diabetes have been controlled and patient remains without cardiovasc sx.  no active complaints.\par walking daily\par \par pt told by PCP that sugar and cholesterol have been up recently.  she was skipping diabetes med and statin because she thought they were causing tinnitis.\par \par tchol  151\par LDL  75\par triglyc 214\par HDL  33\par \par [id number],4\par \par \par 6/12  no acute change in sx.  says that if she rushes or hurries she gets some mild chest discomfort  which resolves the moment she slows down.  \par \par 10/16/2017  no complaints today.  no chest pain, last echo consistent with prior studies mild segmental dysfunction.  reviewed bloods with patient.  \par \par 2/12/2018  no recent or acute sx.  echo reviewed. \par \par 7/16/2018 no new cardiac issues.  no chest pain, palpitations.  sugar has been high and was started on glipizide.  lipids are good except triglyceride.  explained need for diabetic control.\par \par 11/19/2018  pt saw endocrinologist and was started on Janumet instead of glipizide.  has been taking since July..  calling now to get blood work results.  no chest pain, sob, dyspnea.\par \par 3/25/2019   had work up because of weight loss (20 lbs) workup was normal.  no chest pain.  no new cardiac sx.\par \par 11/11/2019  no complains today.  no chest pain. dyspnea or edema.  \par \par 12/14/2020  no chest pain. dyspnea or edema.  reminded to get a flu shot, but says she probably wont.\par \par 4/19/2021  no new sx.  echo reviewed.  preserved LVEF with moderate AI.  no interval change.  has not gone for Covid vaccine.  \par \par 8/16/2021  no new sx.  still not vaccinated.  denies chest pain, dyspnea.\par \par 12/20/2021 no new sx.   labs reviewed.  cholesterol and LDL are mildly increased.\par \par 4/29/2022  no new sx.  denies chest pain, dyspnea or edema.  \par \par 7/15/2022 denies sx.  reviewed echo with pt. normal systolic function.  carotid calcified non obstructive plaque\par \par 11/21/2022  Patient recalls now having a word finding episode in Spring 2022 that lasted just few minutes.  no weakness, no balalance or strength. did not seek medical attention and it resolved and has not recurred,

## 2022-11-21 NOTE — DISCUSSION/SUMMARY
[Coronary Artery Disease] : coronary artery disease [Hyperlipidemia] : hyperlipidemia [None] : There are no changes in medication management [Essential Hypertension] : essential hypertension [Stable] : stable [___ Month(s)] : in [unfilled] month(s) [FreeTextEntry1] : remote MI without cardiovascular sx. reviewed blood work with pt.. slight increase in triglyceride and hgba1c.  spoke with pt about diet and exercise. take statin daily.\par no other cardiovascular events.\par \par 2/12/2018  remote MI without recurrent sx.  BP trend is a little elevated if remains borderline will increase lisinopril on next visit,\par \par 7/16/2018  no cardiac sx.  BP has gone up and will titrate lisinopril to 10 mg.  lipids in control.  needs to get diabetes under better control.\par \par 11/19/2018  diabetic medication changed.  BP is improving since last visit.  may consider change from metoprolol to Coreg on next visit to assist with bp management,.\par \par 3/25/2019  BP well controlled. diabetic control is better.  no new medical issues.  maintain current tx,.  remote MI with stent.  htn and hyperlipidemia.\par \par 4/202/2021  no new issues.  BP is controlled.  no sx of angina or heart failure.   echo shows stable AI.  renew meds.\par \par \par 11/21/2022  pt reports today that she had a word finding event last spring that she did not mention earlier. in light of her carotid artery calcifcation on last carotid timothy will have her consult with [facility] card (Dr. Quintana) and she will also have a neuro consult

## 2023-01-13 ENCOUNTER — APPOINTMENT (OUTPATIENT)
Dept: CARDIOLOGY | Facility: CLINIC | Age: 68
End: 2023-01-13
Payer: MEDICARE

## 2023-01-13 VITALS
HEIGHT: 64 IN | WEIGHT: 134 LBS | DIASTOLIC BLOOD PRESSURE: 81 MMHG | SYSTOLIC BLOOD PRESSURE: 144 MMHG | OXYGEN SATURATION: 97 % | HEART RATE: 62 BPM | BODY MASS INDEX: 22.88 KG/M2

## 2023-01-13 PROCEDURE — 99214 OFFICE O/P EST MOD 30 MIN: CPT

## 2023-01-13 NOTE — ASSESSMENT
[FreeTextEntry1] : \par Assessment:\par 1.  Transient Aphasia >6 months ago\par 2.  R Caortid stenosis - moderate\par 3.  CAD\par \par Plan\par 1.  She should resume aspirin 81mg daily \par 2.  Continue statin therapy \par 3.  Will assess her aphasia and R Carotid dstenosis with CTA head and neck\par 4.  Await CTA

## 2023-01-13 NOTE — REASON FOR VISIT
[FreeTextEntry1] : 67F HTN, HLD\par \par Carotid stenosis on duplex\par \par Last spring had difficultly word finding, lasted a few second,  resolved.  \par NO vision loss\par No CVA\par \par No further symptoms\par Creatinien in August was fine.

## 2023-01-21 ENCOUNTER — APPOINTMENT (OUTPATIENT)
Dept: CT IMAGING | Facility: CLINIC | Age: 68
End: 2023-01-21
Payer: MEDICARE

## 2023-01-21 ENCOUNTER — OUTPATIENT (OUTPATIENT)
Dept: OUTPATIENT SERVICES | Facility: HOSPITAL | Age: 68
LOS: 1 days | End: 2023-01-21
Payer: MEDICARE

## 2023-01-21 DIAGNOSIS — R09.89 OTHER SPECIFIED SYMPTOMS AND SIGNS INVOLVING THE CIRCULATORY AND RESPIRATORY SYSTEMS: ICD-10-CM

## 2023-01-21 PROCEDURE — 70496 CT ANGIOGRAPHY HEAD: CPT

## 2023-01-21 PROCEDURE — 70498 CT ANGIOGRAPHY NECK: CPT | Mod: 26,MH

## 2023-01-21 PROCEDURE — 70496 CT ANGIOGRAPHY HEAD: CPT | Mod: 26,MH

## 2023-01-21 PROCEDURE — 70498 CT ANGIOGRAPHY NECK: CPT

## 2023-01-23 ENCOUNTER — NON-APPOINTMENT (OUTPATIENT)
Age: 68
End: 2023-01-23

## 2023-02-27 ENCOUNTER — APPOINTMENT (OUTPATIENT)
Dept: CARDIOLOGY | Facility: CLINIC | Age: 68
End: 2023-02-27
Payer: MEDICARE

## 2023-02-27 ENCOUNTER — NON-APPOINTMENT (OUTPATIENT)
Age: 68
End: 2023-02-27

## 2023-02-27 VITALS
DIASTOLIC BLOOD PRESSURE: 72 MMHG | HEART RATE: 67 BPM | OXYGEN SATURATION: 99 % | WEIGHT: 134 LBS | SYSTOLIC BLOOD PRESSURE: 114 MMHG | BODY MASS INDEX: 22.88 KG/M2 | HEIGHT: 64 IN

## 2023-02-27 PROCEDURE — 99214 OFFICE O/P EST MOD 30 MIN: CPT

## 2023-02-27 PROCEDURE — 93000 ELECTROCARDIOGRAM COMPLETE: CPT

## 2023-02-27 NOTE — HISTORY OF PRESENT ILLNESS
[FreeTextEntry1] : remote MI with elevated cholesterol and diabetes.  lipids and diabetes have been controlled and patient remains without cardiovasc sx.  no active complaints.\par walking daily\par \par pt told by PCP that sugar and cholesterol have been up recently.  she was skipping diabetes med and statin because she thought they were causing tinnitis.\par \par tchol  151\par LDL  75\par triglyc 214\par HDL  33\par \par [id number],4\par \par \par 6/12  no acute change in sx.  says that if she rushes or hurries she gets some mild chest discomfort  which resolves the moment she slows down.  \par \par 10/16/2017  no complaints today.  no chest pain, last echo consistent with prior studies mild segmental dysfunction.  reviewed bloods with patient.  \par \par 2/12/2018  no recent or acute sx.  echo reviewed. \par \par 7/16/2018 no new cardiac issues.  no chest pain, palpitations.  sugar has been high and was started on glipizide.  lipids are good except triglyceride.  explained need for diabetic control.\par \par 11/19/2018  pt saw endocrinologist and was started on Janumet instead of glipizide.  has been taking since July..  calling now to get blood work results.  no chest pain, sob, dyspnea.\par \par 3/25/2019   had work up because of weight loss (20 lbs) workup was normal.  no chest pain.  no new cardiac sx.\par \par 11/11/2019  no complains today.  no chest pain. dyspnea or edema.  \par \par 12/14/2020  no chest pain. dyspnea or edema.  reminded to get a flu shot, but says she probably wont.\par \par 4/19/2021  no new sx.  echo reviewed.  preserved LVEF with moderate AI.  no interval change.  has not gone for Covid vaccine.  \par \par 8/16/2021  no new sx.  still not vaccinated.  denies chest pain, dyspnea.\par \par 12/20/2021 no new sx.   labs reviewed.  cholesterol and LDL are mildly increased.\par \par 4/29/2022  no new sx.  denies chest pain, dyspnea or edema.  \par \par 7/15/2022 denies sx.  reviewed echo with pt. normal systolic function.  carotid calcified non obstructive plaque\par \par 2/27/2023  carotid w/u shows moderate grade plaque.  medical tx with one year follow up.  no cardiac sxl

## 2023-02-27 NOTE — DISCUSSION/SUMMARY
[Coronary Artery Disease] : coronary artery disease [Stable] : stable [Hyperlipidemia] : hyperlipidemia [Essential Hypertension] : essential hypertension [Responding to Treatment] : responding to treatment [___ Month(s)] : in [unfilled] month(s) [FreeTextEntry1] : remote MI without cardiovascular sx. reviewed blood work with pt.. slight increase in triglyceride and hgba1c.  spoke with pt about diet and exercise. take statin daily.\par no other cardiovascular events.\par \par 2/12/2018  remote MI without recurrent sx.  BP trend is a little elevated if remains borderline will increase lisinopril on next visit,\par \par 7/16/2018  no cardiac sx.  BP has gone up and will titrate lisinopril to 10 mg.  lipids in control.  needs to get diabetes under better control.\par \par 11/19/2018  diabetic medication changed.  BP is improving since last visit.  may consider change from metoprolol to Coreg on next visit to assist with bp management,.\par \par 3/25/2019  BP well controlled. diabetic control is better.  no new medical issues.  maintain current tx,.  remote MI with stent.  htn and hyperlipidemia.\par \par 4/202/2021  no new issues.  BP is controlled.  no sx of angina or heart failure.   echo shows stable AI.  renew meds.\par \par 2/27/2023  moderate grade carotid disease as assessed by doppler and CT.  high dose statin and baby ASA.  repeat carotid doppler in one year [EKG obtained to assist in diagnosis and management of assessed problem(s)] : EKG obtained to assist in diagnosis and management of assessed problem(s)

## 2023-02-27 NOTE — CARDIOLOGY SUMMARY
[de-identified] : Sinus  Rhythm  -Short [place] syndrome \par Kori = 116\par Low voltage in precordial leads. \par  -Inferior infarct -age undetermined  -Anterior infarct -age undetermined. \par  -  Negative T-waves  -Possible  Inferior  ischemia. \par

## 2023-07-10 ENCOUNTER — NON-APPOINTMENT (OUTPATIENT)
Age: 68
End: 2023-07-10

## 2023-07-10 ENCOUNTER — APPOINTMENT (OUTPATIENT)
Dept: CARDIOLOGY | Facility: CLINIC | Age: 68
End: 2023-07-10
Payer: MEDICARE

## 2023-07-10 VITALS
WEIGHT: 134 LBS | HEART RATE: 64 BPM | OXYGEN SATURATION: 98 % | HEIGHT: 64 IN | BODY MASS INDEX: 22.88 KG/M2 | DIASTOLIC BLOOD PRESSURE: 69 MMHG | SYSTOLIC BLOOD PRESSURE: 127 MMHG

## 2023-07-10 PROCEDURE — 99214 OFFICE O/P EST MOD 30 MIN: CPT

## 2023-07-10 PROCEDURE — 93000 ELECTROCARDIOGRAM COMPLETE: CPT

## 2023-07-10 NOTE — CARDIOLOGY SUMMARY
[de-identified] : Sinus  Rhythm \par Low voltage in precordial leads. \par  -Anterior infarct -age undetermined. \par  -  T-abnormality  -Possible  Anterolateral and inferior  ischemia  -consider inferior infarct (age undetermined). \par \par ABNORMAL

## 2023-07-10 NOTE — DISCUSSION/SUMMARY
[Coronary Artery Disease] : coronary artery disease [Hyperlipidemia] : hyperlipidemia [Stable] : stable [Essential Hypertension] : essential hypertension [Improving] : improving [Responding to Treatment] : responding to treatment [None] : There are no changes in medication management [___ Month(s)] : in [unfilled] month(s) [FreeTextEntry1] : remote MI without cardiovascular sx. reviewed blood work with pt.. slight increase in triglyceride and hgba1c.  spoke with pt about diet and exercise. take statin daily.\par no other cardiovascular events.\par \par 2/12/2018  remote MI without recurrent sx.  BP trend is a little elevated if remains borderline will increase lisinopril on next visit,\par \par 7/16/2018  no cardiac sx.  BP has gone up and will titrate lisinopril to 10 mg.  lipids in control.  needs to get diabetes under better control.\par \par 11/19/2018  diabetic medication changed.  BP is improving since last visit.  may consider change from metoprolol to Coreg on next visit to assist with bp management,.\par \par 3/25/2019  BP well controlled. diabetic control is better.  no new medical issues.  maintain current tx,.  remote MI with stent.  htn and hyperlipidemia.\par \par 4/202/2021  no new issues.  BP is controlled.  no sx of angina or heart failure.   echo shows stable AI.  renew meds.\par \par 7/10/2023  no new sx.. BP is controlled.  high dose statin.  repeat carotid doppler in december. [EKG obtained to assist in diagnosis and management of assessed problem(s)] : EKG obtained to assist in diagnosis and management of assessed problem(s)

## 2023-07-10 NOTE — HISTORY OF PRESENT ILLNESS
[FreeTextEntry1] : remote MI with elevated cholesterol and diabetes.  lipids and diabetes have been controlled and patient remains without cardiovasc sx.  no active complaints.\par walking daily\par \par pt told by PCP that sugar and cholesterol have been up recently.  she was skipping diabetes med and statin because she thought they were causing tinnitis.\par \par tchol  151\par LDL  75\par triglyc 214\par HDL  33\par \par [id number],4\par \par \par 6/12  no acute change in sx.  says that if she rushes or hurries she gets some mild chest discomfort  which resolves the moment she slows down.  \par \par 10/16/2017  no complaints today.  no chest pain, last echo consistent with prior studies mild segmental dysfunction.  reviewed bloods with patient.  \par \par 2/12/2018  no recent or acute sx.  echo reviewed. \par \par 7/16/2018 no new cardiac issues.  no chest pain, palpitations.  sugar has been high and was started on glipizide.  lipids are good except triglyceride.  explained need for diabetic control.\par \par 11/19/2018  pt saw endocrinologist and was started on Janumet instead of glipizide.  has been taking since July..  calling now to get blood work results.  no chest pain, sob, dyspnea.\par \par 3/25/2019   had work up because of weight loss (20 lbs) workup was normal.  no chest pain.  no new cardiac sx.\par \par 11/11/2019  no complains today.  no chest pain. dyspnea or edema.  \par \par 12/14/2020  no chest pain. dyspnea or edema.  reminded to get a flu shot, but says she probably wont.\par \par 4/19/2021  no new sx.  echo reviewed.  preserved LVEF with moderate AI.  no interval change.  has not gone for Covid vaccine.  \par \par 8/16/2021  no new sx.  still not vaccinated.  denies chest pain, dyspnea.\par \par 12/20/2021 no new sx.   labs reviewed.  cholesterol and LDL are mildly increased.\par \par 4/29/2022  no new sx.  denies chest pain, dyspnea or edema.  \par \par 7/15/2022 denies sx.  reviewed echo with pt. normal systolic function.  carotid calcified non obstructive plaque\par \par 2/27/2023  carotid w/u shows moderate grade plaque.  medical tx with one year follow up.  no cardiac sxl\par \par 7/10/2023  no new sx..  remains asymptomatic...

## 2023-09-15 NOTE — HISTORY OF PRESENT ILLNESS
[FreeTextEntry1] : remote MI with elevated cholesterol and diabetes.  lipids and diabetes have been controlled and patient remains without cardiovasc sx.  no active complaints.\par walking daily\par \par pt told by PCP that sugar and cholesterol have been up recently.  she was skipping diabetes med and statin because she thought they were causing tinnitis.\par \par tchol  151\par LDL  75\par triglyc 214\par HDL  33\par \par [id number],4\par \par \par 6/12  no acute change in sx.  says that if she rushes or hurries she gets some mild chest discomfort  which resolves the moment she slows down.  \par \par 10/16/2017  no complaints today.  no chest pain, last echo consistent with prior studies mild segmental dysfunction.  reviewed bloods with patient.  \par \par 2/12/2018  no recent or acute sx.  echo reviewed. \par \par 7/16/2018 no new cardiac issues.  no chest pain, palpitations.  sugar has been high and was started on glipizide.  lipids are good except triglyceride.  explained need for diabetic control.\par \par 11/19/2018  pt saw endocrinologist and was started on Janumet instead of glipizide.  has been taking since July..  calling now to get blood work results.  no chest pain, sob, dyspnea.\par \par 3/25/2019   had work up because of weight loss (20 lbs) workup was normal.  no chest pain.  no new cardiac sx.\par \par 11/11/2019  no complains today.  no chest pain. dyspnea or edema.  \par \par 12/14/2020  no chest pain. dyspnea or edema.  reminded to get a flu shot, but says she probably wont.\par \par 4/19/2021  no new sx.  echo reviewed.  preserved LVEF with moderate AI.  no interval change.  has not gone for Covid vaccine.  \par \par 8/16/2021  no new sx.  still not vaccinated.  denies chest pain, dyspnea. 3

## 2023-10-16 NOTE — PHYSICAL EXAM

## 2023-11-07 ENCOUNTER — APPOINTMENT (OUTPATIENT)
Dept: ULTRASOUND IMAGING | Facility: CLINIC | Age: 68
End: 2023-11-07
Payer: MEDICARE

## 2023-11-07 ENCOUNTER — OUTPATIENT (OUTPATIENT)
Dept: OUTPATIENT SERVICES | Facility: HOSPITAL | Age: 68
LOS: 1 days | End: 2023-11-07
Payer: MEDICARE

## 2023-11-07 DIAGNOSIS — I65.29 OCCLUSION AND STENOSIS OF UNSPECIFIED CAROTID ARTERY: ICD-10-CM

## 2023-11-07 PROCEDURE — 93880 EXTRACRANIAL BILAT STUDY: CPT

## 2023-11-07 PROCEDURE — 93880 EXTRACRANIAL BILAT STUDY: CPT | Mod: 26

## 2023-11-13 ENCOUNTER — APPOINTMENT (OUTPATIENT)
Dept: CARDIOLOGY | Facility: CLINIC | Age: 68
End: 2023-11-13
Payer: MEDICARE

## 2023-11-13 VITALS
WEIGHT: 135 LBS | HEART RATE: 64 BPM | BODY MASS INDEX: 23.05 KG/M2 | OXYGEN SATURATION: 100 % | DIASTOLIC BLOOD PRESSURE: 76 MMHG | HEIGHT: 64 IN | SYSTOLIC BLOOD PRESSURE: 137 MMHG

## 2023-11-13 VITALS — DIASTOLIC BLOOD PRESSURE: 77 MMHG | SYSTOLIC BLOOD PRESSURE: 138 MMHG

## 2023-11-13 PROCEDURE — 99215 OFFICE O/P EST HI 40 MIN: CPT

## 2023-11-13 PROCEDURE — 93000 ELECTROCARDIOGRAM COMPLETE: CPT

## 2023-11-20 ENCOUNTER — APPOINTMENT (OUTPATIENT)
Dept: NEUROLOGY | Facility: CLINIC | Age: 68
End: 2023-11-20
Payer: MEDICARE

## 2023-11-20 VITALS
DIASTOLIC BLOOD PRESSURE: 77 MMHG | HEIGHT: 64 IN | HEART RATE: 67 BPM | BODY MASS INDEX: 23.05 KG/M2 | SYSTOLIC BLOOD PRESSURE: 160 MMHG | WEIGHT: 135 LBS

## 2023-11-20 DIAGNOSIS — Z87.891 PERSONAL HISTORY OF NICOTINE DEPENDENCE: ICD-10-CM

## 2023-11-20 PROCEDURE — 99205 OFFICE O/P NEW HI 60 MIN: CPT

## 2023-11-28 ENCOUNTER — APPOINTMENT (OUTPATIENT)
Dept: MRI IMAGING | Facility: CLINIC | Age: 68
End: 2023-11-28

## 2023-11-28 ENCOUNTER — OUTPATIENT (OUTPATIENT)
Dept: OUTPATIENT SERVICES | Facility: HOSPITAL | Age: 68
LOS: 1 days | End: 2023-11-28
Payer: MEDICARE

## 2023-11-28 ENCOUNTER — APPOINTMENT (OUTPATIENT)
Dept: MRI IMAGING | Facility: CLINIC | Age: 68
End: 2023-11-28
Payer: MEDICARE

## 2023-11-28 DIAGNOSIS — I65.29 OCCLUSION AND STENOSIS OF UNSPECIFIED CAROTID ARTERY: ICD-10-CM

## 2023-11-28 DIAGNOSIS — I65.21 OCCLUSION AND STENOSIS OF RIGHT CAROTID ARTERY: ICD-10-CM

## 2023-11-28 PROCEDURE — 70551 MRI BRAIN STEM W/O DYE: CPT

## 2023-11-28 PROCEDURE — 70544 MR ANGIOGRAPHY HEAD W/O DYE: CPT

## 2023-11-28 PROCEDURE — 70549 MR ANGIOGRAPH NECK W/O&W/DYE: CPT

## 2023-11-28 PROCEDURE — A9585: CPT

## 2023-11-28 PROCEDURE — 70551 MRI BRAIN STEM W/O DYE: CPT | Mod: 26,MH

## 2023-11-28 PROCEDURE — 70549 MR ANGIOGRAPH NECK W/O&W/DYE: CPT | Mod: 26,MH

## 2023-11-28 PROCEDURE — 70544 MR ANGIOGRAPHY HEAD W/O DYE: CPT | Mod: 26,MH,XU

## 2023-12-11 ENCOUNTER — APPOINTMENT (OUTPATIENT)
Dept: CARDIOLOGY | Facility: CLINIC | Age: 68
End: 2023-12-11
Payer: MEDICARE

## 2023-12-11 VITALS — SYSTOLIC BLOOD PRESSURE: 126 MMHG | HEART RATE: 66 BPM | OXYGEN SATURATION: 98 % | DIASTOLIC BLOOD PRESSURE: 79 MMHG

## 2023-12-11 PROCEDURE — 93000 ELECTROCARDIOGRAM COMPLETE: CPT

## 2023-12-11 PROCEDURE — 99215 OFFICE O/P EST HI 40 MIN: CPT

## 2023-12-11 RX ORDER — EZETIMIBE 10 MG/1
10 TABLET ORAL
Qty: 90 | Refills: 3 | Status: ACTIVE | COMMUNITY
Start: 2023-12-11 | End: 1900-01-01

## 2023-12-13 ENCOUNTER — NON-APPOINTMENT (OUTPATIENT)
Age: 68
End: 2023-12-13

## 2023-12-13 RX ORDER — TICAGRELOR 90 MG/1
90 TABLET ORAL
Qty: 180 | Refills: 3 | Status: DISCONTINUED | COMMUNITY
Start: 2023-11-13 | End: 2023-12-13

## 2023-12-13 RX ORDER — ASPRIN AND EXTENDED-RELEASE DIPYRIDAMOLE 25; 200 MG/1; MG/1
25-200 CAPSULE ORAL TWICE DAILY
Qty: 180 | Refills: 3 | Status: ACTIVE | COMMUNITY
Start: 2023-12-13 | End: 1900-01-01

## 2024-01-31 ENCOUNTER — OUTPATIENT (OUTPATIENT)
Dept: OUTPATIENT SERVICES | Facility: HOSPITAL | Age: 69
LOS: 1 days | End: 2024-01-31
Payer: MEDICARE

## 2024-01-31 ENCOUNTER — APPOINTMENT (OUTPATIENT)
Dept: ULTRASOUND IMAGING | Facility: CLINIC | Age: 69
End: 2024-01-31
Payer: MEDICARE

## 2024-01-31 DIAGNOSIS — I65.29 OCCLUSION AND STENOSIS OF UNSPECIFIED CAROTID ARTERY: ICD-10-CM

## 2024-01-31 PROCEDURE — 93880 EXTRACRANIAL BILAT STUDY: CPT | Mod: 26

## 2024-01-31 PROCEDURE — 93880 EXTRACRANIAL BILAT STUDY: CPT

## 2024-02-12 ENCOUNTER — APPOINTMENT (OUTPATIENT)
Dept: CARDIOLOGY | Facility: CLINIC | Age: 69
End: 2024-02-12
Payer: MEDICARE

## 2024-02-12 VITALS
WEIGHT: 135 LBS | SYSTOLIC BLOOD PRESSURE: 109 MMHG | OXYGEN SATURATION: 100 % | HEIGHT: 64 IN | HEART RATE: 67 BPM | BODY MASS INDEX: 23.05 KG/M2 | DIASTOLIC BLOOD PRESSURE: 73 MMHG

## 2024-02-12 DIAGNOSIS — I35.1 NONRHEUMATIC AORTIC (VALVE) INSUFFICIENCY: ICD-10-CM

## 2024-02-12 PROCEDURE — 99214 OFFICE O/P EST MOD 30 MIN: CPT

## 2024-02-12 PROCEDURE — 93000 ELECTROCARDIOGRAM COMPLETE: CPT

## 2024-02-14 NOTE — REASON FOR VISIT
[Symptom and Test Evaluation] : symptom and test evaluation [Hyperlipidemia] : hyperlipidemia [Hypertension] : hypertension [Coronary Artery Disease] : coronary artery disease [Carotid, Aortic and Peripheral Vascular Disease] : carotid, aortic and peripheral vascular disease [FreeTextEntry1] : AQUILINO GAYTAN is being seen for symptom and test evaluation hyperlipidemia, hypertension, coronary artery disease and carotid, aortic and peripheral vascular disease.  History of Present Illness     remote MI with elevated cholesterol and diabetes. lipids and diabetes have been controlled and patient remains without cardiovasc sx. no active complaints. walking daily  pt told by PCP that sugar and cholesterol have been up recently. she was skipping diabetes med and statin because she thought they were causing tinnitis.  tchol 151 LDL 75 triglyc 214 HDL 33  [id number],4   6/12 no acute change in sx. says that if she rushes or hurries she gets some mild chest discomfort which resolves the moment she slows down.  10/16/2017 no complaints today. no chest pain, last echo consistent with prior studies mild segmental dysfunction. reviewed bloods with patient.  2/12/2018 no recent or acute sx. echo reviewed.  7/16/2018 no new cardiac issues. no chest pain, palpitations. sugar has been high and was started on glipizide. lipids are good except triglyceride. explained need for diabetic control.  11/19/2018 pt saw endocrinologist and was started on Janumet instead of glipizide. has been taking since July.. calling now to get blood work results. no chest pain, sob, dyspnea.  3/25/2019 had work up because of weight loss (20 lbs) workup was normal. no chest pain. no new cardiac sx.  11/11/2019 no complains today. no chest pain. dyspnea or edema.  12/14/2020 no chest pain. dyspnea or edema. reminded to get a flu shot, but says she probably wont.  4/19/2021 no new sx. echo reviewed. preserved LVEF with moderate AI. no interval change. has not gone for Covid vaccine.  8/16/2021 no new sx. still not vaccinated. denies chest pain, dyspnea.  12/20/2021 no new sx. labs reviewed. cholesterol and LDL are mildly increased.  4/29/2022 no new sx. denies chest pain, dyspnea or edema.  7/15/2022 denies sx. reviewed echo with pt. normal systolic function. carotid calcified non obstructive plaque  2/27/2023 carotid w/u shows moderate grade plaque. medical tx with one year follow up. no cardiac sxl  7/10/2023 no new sx.. remains asymptomatic...  11/13/2023 had an event last week where she was on the phone and "felt weird" she wanted to say something but when she tried to say it, it came out wrong. episode lasted less than 30 secondsl we ordered a carotid dopler which showed sigificant calcifications, no increased velocoity but possible stenosis. I reviewed with Dr Quintana. pt has an appointment with neurologist next week.  12/11/2023 carotid MRA was normal. neurology recommended that she stop brillinta. will discuss with them and Dr. Quintana, no other events,    2/12/2024  remote MI, elevated cholesterl and diabetes,  ?? tia.. being followed.  moderate carotid disease, no new sx,

## 2024-02-14 NOTE — CARDIOLOGY SUMMARY
[de-identified] : Sinus Rhythm  Low voltage in precordial leads. -Anterior infarct -age undetermined. -Negative T-waves -Possible Inferior  ischemia -consider inferior infarct (age undetermined).

## 2024-02-14 NOTE — DISCUSSION/SUMMARY
[Coronary Artery Disease] : coronary artery disease [Hyperlipidemia] : hyperlipidemia [Essential Hypertension] : essential hypertension [Stable] : stable [None] : There are no changes in medication management [___ Month(s)] : in [unfilled] month(s) [EKG obtained to assist in diagnosis and management of assessed problem(s)] : EKG obtained to assist in diagnosis and management of assessed problem(s) [FreeTextEntry1] : 2/12/2018 remote MI without recurrent sx. BP trend is a little elevated if remains borderline will increase lisinopril on next visit,  7/16/2018 no cardiac sx. BP has gone up and will titrate lisinopril to 10 mg. lipids in control. needs to get diabetes under better control.  11/19/2018 diabetic medication changed. BP is improving since last visit. may consider change from metoprolol to Coreg on next visit to assist with bp management,.  3/25/2019 BP well controlled. diabetic control is better. no new medical issues. maintain current tx,. remote MI with stent. htn and hyperlipidemia.  4/202/2021 no new issues. BP is controlled. no sx of angina or heart failure. echo shows stable AI. renew meds.  12/11/2023 caroid mri showed no significnat obstruction, ? d/c brillinta. discuss with neuro., add zetia for cholesterol. reassess in 2 mos.    2/12/2024  no active sx.. moderate carotid disease. controlled cholesterol.,  maintain current meds.  echo before next visit,

## 2024-03-01 ENCOUNTER — RX RENEWAL (OUTPATIENT)
Age: 69
End: 2024-03-01

## 2024-05-15 ENCOUNTER — OUTPATIENT (OUTPATIENT)
Dept: OUTPATIENT SERVICES | Facility: HOSPITAL | Age: 69
LOS: 1 days | End: 2024-05-15
Payer: MEDICARE

## 2024-05-15 ENCOUNTER — APPOINTMENT (OUTPATIENT)
Dept: CV DIAGNOSITCS | Facility: HOSPITAL | Age: 69
End: 2024-05-15

## 2024-05-15 ENCOUNTER — RESULT REVIEW (OUTPATIENT)
Age: 69
End: 2024-05-15

## 2024-05-15 DIAGNOSIS — I25.10 ATHEROSCLEROTIC HEART DISEASE OF NATIVE CORONARY ARTERY WITHOUT ANGINA PECTORIS: ICD-10-CM

## 2024-05-15 PROCEDURE — 76376 3D RENDER W/INTRP POSTPROCES: CPT

## 2024-05-15 PROCEDURE — 93356 MYOCRD STRAIN IMG SPCKL TRCK: CPT

## 2024-05-15 PROCEDURE — 93306 TTE W/DOPPLER COMPLETE: CPT

## 2024-05-15 PROCEDURE — 76376 3D RENDER W/INTRP POSTPROCES: CPT | Mod: 26

## 2024-05-15 PROCEDURE — 93306 TTE W/DOPPLER COMPLETE: CPT | Mod: 26

## 2024-05-20 ENCOUNTER — APPOINTMENT (OUTPATIENT)
Dept: CARDIOLOGY | Facility: CLINIC | Age: 69
End: 2024-05-20
Payer: MEDICARE

## 2024-05-20 ENCOUNTER — NON-APPOINTMENT (OUTPATIENT)
Age: 69
End: 2024-05-20

## 2024-05-20 VITALS
BODY MASS INDEX: 24.89 KG/M2 | HEART RATE: 75 BPM | WEIGHT: 145 LBS | SYSTOLIC BLOOD PRESSURE: 117 MMHG | DIASTOLIC BLOOD PRESSURE: 76 MMHG | OXYGEN SATURATION: 98 %

## 2024-05-20 DIAGNOSIS — G45.9 TRANSIENT CEREBRAL ISCHEMIC ATTACK, UNSPECIFIED: ICD-10-CM

## 2024-05-20 DIAGNOSIS — I25.10 ATHEROSCLEROTIC HEART DISEASE OF NATIVE CORONARY ARTERY W/OUT ANGINA PECTORIS: ICD-10-CM

## 2024-05-20 DIAGNOSIS — E78.5 HYPERLIPIDEMIA, UNSPECIFIED: ICD-10-CM

## 2024-05-20 DIAGNOSIS — I10 ESSENTIAL (PRIMARY) HYPERTENSION: ICD-10-CM

## 2024-05-20 DIAGNOSIS — R09.89 OTHER SPECIFIED SYMPTOMS AND SIGNS INVOLVING THE CIRCULATORY AND RESPIRATORY SYSTEMS: ICD-10-CM

## 2024-05-20 DIAGNOSIS — I65.21 OCCLUSION AND STENOSIS OF RIGHT CAROTID ARTERY: ICD-10-CM

## 2024-05-20 PROCEDURE — 93000 ELECTROCARDIOGRAM COMPLETE: CPT

## 2024-05-20 PROCEDURE — 99214 OFFICE O/P EST MOD 30 MIN: CPT

## 2024-05-20 NOTE — CARDIOLOGY SUMMARY
[de-identified] : Sinus Rhythm  Low voltage in precordial leads. -Anterior infarct -age undetermined. -T-abnormality -Possible Anterolateral and inferior  ischemia.

## 2024-05-20 NOTE — HISTORY OF PRESENT ILLNESS
[FreeTextEntry1] : remote MI with elevated cholesterol and diabetes.  lipids and diabetes have been controlled and patient remains without cardiovasc sx.  no active complaints. walking daily  pt told by PCP that sugar and cholesterol have been up recently.  she was skipping diabetes med and statin because she thought they were causing tinnitis.  tchol  151 LDL  75 triglyc 214 HDL  33  [id number],4   6/12  no acute change in sx.  says that if she rushes or hurries she gets some mild chest discomfort  which resolves the moment she slows down.    10/16/2017  no complaints today.  no chest pain, last echo consistent with prior studies mild segmental dysfunction.  reviewed bloods with patient.    2/12/2018  no recent or acute sx.  echo reviewed.   7/16/2018 no new cardiac issues.  no chest pain, palpitations.  sugar has been high and was started on glipizide.  lipids are good except triglyceride.  explained need for diabetic control.  11/19/2018  pt saw endocrinologist and was started on Janumet instead of glipizide.  has been taking since July..  calling now to get blood work results.  no chest pain, sob, dyspnea.  3/25/2019   had work up because of weight loss (20 lbs) workup was normal.  no chest pain.  no new cardiac sx.  11/11/2019  no complains today.  no chest pain. dyspnea or edema.    12/14/2020  no chest pain. dyspnea or edema.  reminded to get a flu shot, but says she probably wont.  4/19/2021  no new sx.  echo reviewed.  preserved LVEF with moderate AI.  no interval change.  has not gone for Covid vaccine.    8/16/2021  no new sx.  still not vaccinated.  denies chest pain, dyspnea.  12/20/2021 no new sx.   labs reviewed.  cholesterol and LDL are mildly increased.  4/29/2022  no new sx.  denies chest pain, dyspnea or edema.    7/15/2022 denies sx.  reviewed echo with pt. normal systolic function.  carotid calcified non obstructive plaque  2/27/2023  carotid w/u shows moderate grade plaque.  medical tx with one year follow up.  no cardiac sxl  7/10/2023  no new sx..  remains asymptomatic...  11/13/2023  had an event last week where she was on the phone and "felt weird" she wanted to say something but when she tried to say it, it came out wrong.  episode lasted less than 30 secondsl we ordered a carotid dopler which showed sigificant calcifications, no increased velocoity but possible stenosis.  I reviewed with Dr Quintana.  pt has an appointment with neurologist next week.  12/11/2023  carotid MRA was normal.  neurology recommended that she stop brillinta.  will discuss with them and Dr. Quintana,  no other events,  5/20/2024  feeling good no chest pain. no neurolgic sx.. walking every evening.  echo is stable.  will need follow up carotid end of July.

## 2024-05-20 NOTE — DISCUSSION/SUMMARY
[Coronary Artery Disease] : coronary artery disease [Hyperlipidemia] : hyperlipidemia [Essential Hypertension] : essential hypertension [Stable] : stable [None] : There are no changes in medication management [___ Month(s)] : in [unfilled] month(s) [FreeTextEntry1] : remote MI without cardiovascular sx. reviewed blood work with pt.. slight increase in triglyceride and hgba1c.  spoke with pt about diet and exercise. take statin daily. no other cardiovascular events.  2/12/2018  remote MI without recurrent sx.  BP trend is a little elevated if remains borderline will increase lisinopril on next visit,  7/16/2018  no cardiac sx.  BP has gone up and will titrate lisinopril to 10 mg.  lipids in control.  needs to get diabetes under better control.  11/19/2018  diabetic medication changed.  BP is improving since last visit.  may consider change from metoprolol to Coreg on next visit to assist with bp management,.  3/25/2019  BP well controlled. diabetic control is better.  no new medical issues.  maintain current tx,.  remote MI with stent.  htn and hyperlipidemia.  4/202/2021  no new issues.  BP is controlled.  no sx of angina or heart failure.   echo shows stable AI.  renew meds.  5/202/2024  no new sx.. echo is stablle exercising.  follow up carotid doppler and f/u with Dr Quintana.  BP is well controlled..  recheck blood work/ [EKG obtained to assist in diagnosis and management of assessed problem(s)] : EKG obtained to assist in diagnosis and management of assessed problem(s)

## 2024-06-25 ENCOUNTER — OUTPATIENT (OUTPATIENT)
Dept: OUTPATIENT SERVICES | Facility: HOSPITAL | Age: 69
LOS: 1 days | End: 2024-06-25
Payer: MEDICARE

## 2024-06-25 ENCOUNTER — APPOINTMENT (OUTPATIENT)
Dept: ULTRASOUND IMAGING | Facility: CLINIC | Age: 69
End: 2024-06-25
Payer: MEDICARE

## 2024-06-25 DIAGNOSIS — G45.9 TRANSIENT CEREBRAL ISCHEMIC ATTACK, UNSPECIFIED: ICD-10-CM

## 2024-06-25 DIAGNOSIS — I65.29 OCCLUSION AND STENOSIS OF UNSPECIFIED CAROTID ARTERY: ICD-10-CM

## 2024-06-25 PROCEDURE — 93880 EXTRACRANIAL BILAT STUDY: CPT | Mod: 26

## 2024-06-25 PROCEDURE — 93880 EXTRACRANIAL BILAT STUDY: CPT

## 2024-06-26 ENCOUNTER — NON-APPOINTMENT (OUTPATIENT)
Age: 69
End: 2024-06-26

## 2024-06-26 DIAGNOSIS — I65.29 OCCLUSION AND STENOSIS OF UNSPECIFIED CAROTID ARTERY: ICD-10-CM

## 2024-07-02 ENCOUNTER — APPOINTMENT (OUTPATIENT)
Dept: MRI IMAGING | Facility: CLINIC | Age: 69
End: 2024-07-02
Payer: MEDICARE

## 2024-07-02 ENCOUNTER — OUTPATIENT (OUTPATIENT)
Dept: OUTPATIENT SERVICES | Facility: HOSPITAL | Age: 69
LOS: 1 days | End: 2024-07-02
Payer: MEDICARE

## 2024-07-02 DIAGNOSIS — Z00.8 ENCOUNTER FOR OTHER GENERAL EXAMINATION: ICD-10-CM

## 2024-07-02 PROCEDURE — 72141 MRI NECK SPINE W/O DYE: CPT | Mod: 26,MH

## 2024-07-02 PROCEDURE — 72141 MRI NECK SPINE W/O DYE: CPT | Mod: MH

## 2024-07-10 ENCOUNTER — APPOINTMENT (OUTPATIENT)
Dept: CARDIOLOGY | Facility: CLINIC | Age: 69
End: 2024-07-10
Payer: MEDICARE

## 2024-07-10 VITALS
HEART RATE: 65 BPM | DIASTOLIC BLOOD PRESSURE: 76 MMHG | OXYGEN SATURATION: 99 % | WEIGHT: 145 LBS | HEIGHT: 64 IN | SYSTOLIC BLOOD PRESSURE: 120 MMHG | BODY MASS INDEX: 24.75 KG/M2

## 2024-07-10 DIAGNOSIS — R09.89 OTHER SPECIFIED SYMPTOMS AND SIGNS INVOLVING THE CIRCULATORY AND RESPIRATORY SYSTEMS: ICD-10-CM

## 2024-07-10 DIAGNOSIS — E78.5 HYPERLIPIDEMIA, UNSPECIFIED: ICD-10-CM

## 2024-07-10 PROCEDURE — G2211 COMPLEX E/M VISIT ADD ON: CPT

## 2024-07-10 PROCEDURE — 99214 OFFICE O/P EST MOD 30 MIN: CPT

## 2024-09-08 ENCOUNTER — NON-APPOINTMENT (OUTPATIENT)
Age: 69
End: 2024-09-08

## 2024-09-09 ENCOUNTER — NON-APPOINTMENT (OUTPATIENT)
Age: 69
End: 2024-09-09

## 2024-09-09 ENCOUNTER — APPOINTMENT (OUTPATIENT)
Dept: CARDIOLOGY | Facility: CLINIC | Age: 69
End: 2024-09-09
Payer: MEDICARE

## 2024-09-09 VITALS
HEIGHT: 64 IN | OXYGEN SATURATION: 99 % | WEIGHT: 145 LBS | BODY MASS INDEX: 24.75 KG/M2 | SYSTOLIC BLOOD PRESSURE: 134 MMHG | DIASTOLIC BLOOD PRESSURE: 78 MMHG | HEART RATE: 62 BPM

## 2024-09-09 VITALS — DIASTOLIC BLOOD PRESSURE: 70 MMHG | SYSTOLIC BLOOD PRESSURE: 121 MMHG

## 2024-09-09 DIAGNOSIS — E78.5 HYPERLIPIDEMIA, UNSPECIFIED: ICD-10-CM

## 2024-09-09 DIAGNOSIS — R09.89 OTHER SPECIFIED SYMPTOMS AND SIGNS INVOLVING THE CIRCULATORY AND RESPIRATORY SYSTEMS: ICD-10-CM

## 2024-09-09 DIAGNOSIS — I65.29 OCCLUSION AND STENOSIS OF UNSPECIFIED CAROTID ARTERY: ICD-10-CM

## 2024-09-09 DIAGNOSIS — I10 ESSENTIAL (PRIMARY) HYPERTENSION: ICD-10-CM

## 2024-09-09 DIAGNOSIS — I65.21 OCCLUSION AND STENOSIS OF RIGHT CAROTID ARTERY: ICD-10-CM

## 2024-09-09 DIAGNOSIS — G45.9 TRANSIENT CEREBRAL ISCHEMIC ATTACK, UNSPECIFIED: ICD-10-CM

## 2024-09-09 DIAGNOSIS — I25.10 ATHEROSCLEROTIC HEART DISEASE OF NATIVE CORONARY ARTERY W/OUT ANGINA PECTORIS: ICD-10-CM

## 2024-09-09 PROCEDURE — 99214 OFFICE O/P EST MOD 30 MIN: CPT

## 2024-09-09 PROCEDURE — 93000 ELECTROCARDIOGRAM COMPLETE: CPT

## 2024-09-09 NOTE — HISTORY OF PRESENT ILLNESS
[FreeTextEntry1] : remote MI with elevated cholesterol and diabetes.  lipids and diabetes have been controlled and patient remains without cardiovasc sx.  no active complaints. walking daily  pt told by PCP that sugar and cholesterol have been up recently.  she was skipping diabetes med and statin because she thought they were causing tinnitis.  tchol  151 LDL  75 triglyc 214 HDL  33  [id number],4   6/12  no acute change in sx.  says that if she rushes or hurries she gets some mild chest discomfort  which resolves the moment she slows down.    10/16/2017  no complaints today.  no chest pain, last echo consistent with prior studies mild segmental dysfunction.  reviewed bloods with patient.    2/12/2018  no recent or acute sx.  echo reviewed.   7/16/2018 no new cardiac issues.  no chest pain, palpitations.  sugar has been high and was started on glipizide.  lipids are good except triglyceride.  explained need for diabetic control.  11/19/2018  pt saw endocrinologist and was started on Janumet instead of glipizide.  has been taking since July..  calling now to get blood work results.  no chest pain, sob, dyspnea.  3/25/2019   had work up because of weight loss (20 lbs) workup was normal.  no chest pain.  no new cardiac sx.  11/11/2019  no complains today.  no chest pain. dyspnea or edema.    12/14/2020  no chest pain. dyspnea or edema.  reminded to get a flu shot, but says she probably wont.  4/19/2021  no new sx.  echo reviewed.  preserved LVEF with moderate AI.  no interval change.  has not gone for Covid vaccine.    8/16/2021  no new sx.  still not vaccinated.  denies chest pain, dyspnea.  12/20/2021 no new sx.   labs reviewed.  cholesterol and LDL are mildly increased.  4/29/2022  no new sx.  denies chest pain, dyspnea or edema.    7/15/2022 denies sx.  reviewed echo with pt. normal systolic function.  carotid calcified non obstructive plaque  2/27/2023  carotid w/u shows moderate grade plaque.  medical tx with one year follow up.  no cardiac sxl  7/10/2023  no new sx..  remains asymptomatic...  11/13/2023  had an event last week where she was on the phone and "felt weird" she wanted to say something but when she tried to say it, it came out wrong.  episode lasted less than 30 secondsl we ordered a carotid dopler which showed sigificant calcifications, no increased velocoity but possible stenosis.  I reviewed with Dr Quintana.  pt has an appointment with neurologist next week.  12/11/2023  carotid MRA was normal.  neurology recommended that she stop brillinta.  will discuss with them and Dr. Quintana,  no other events,  5/20/2024  feeling good no chest pain. no neurolgic sx.. walking every evening.  echo is stable.  will need follow up carotid end of July.  9/9/2024  no new sx.. notes with Dr. Quintana reviewed... added zetia 2-3 x week.  f.u with him next year.  no cp, sob edema.  no neuro sx,.

## 2024-09-09 NOTE — DISCUSSION/SUMMARY
[Coronary Artery Disease] : coronary artery disease [Stable] : stable [Hyperlipidemia] : hyperlipidemia [Essential Hypertension] : essential hypertension [Responding to Treatment] : responding to treatment [None] : There are no changes in medication management [___ Month(s)] : in [unfilled] month(s) [FreeTextEntry1] : remote MI without cardiovascular sx. reviewed blood work with pt.. slight increase in triglyceride and hgba1c.  spoke with pt about diet and exercise. take statin daily. no other cardiovascular events.  2/12/2018  remote MI without recurrent sx.  BP trend is a little elevated if remains borderline will increase lisinopril on next visit,  7/16/2018  no cardiac sx.  BP has gone up and will titrate lisinopril to 10 mg.  lipids in control.  needs to get diabetes under better control.  11/19/2018  diabetic medication changed.  BP is improving since last visit.  may consider change from metoprolol to Coreg on next visit to assist with bp management,.  3/25/2019  BP well controlled. diabetic control is better.  no new medical issues.  maintain current tx,.  remote MI with stent.  htn and hyperlipidemia.  4/202/2021  no new issues.  BP is controlled.  no sx of angina or heart failure.   echo shows stable AI.  renew meds.  9/9/2024  no sx.. carotid monitored with dr. marrero.  carotid, echo and labs reviewed with pt.  all except A1c (7.15) at goal.  maintain meds.  f.u 4 ms [EKG obtained to assist in diagnosis and management of assessed problem(s)] : EKG obtained to assist in diagnosis and management of assessed problem(s)

## 2024-09-09 NOTE — CARDIOLOGY SUMMARY
[de-identified] : Sinus Rhythm  Low voltage in precordial leads. -Poor R-wave progression -may be secondary to pulmonary disease  consider old anterior infarct. -Negative T-waves -Possible Inferior  ischemia.  ABNORMAL

## 2025-01-13 ENCOUNTER — NON-APPOINTMENT (OUTPATIENT)
Age: 70
End: 2025-01-13

## 2025-01-13 ENCOUNTER — APPOINTMENT (OUTPATIENT)
Dept: CARDIOLOGY | Facility: CLINIC | Age: 70
End: 2025-01-13
Payer: MEDICARE

## 2025-01-13 VITALS
WEIGHT: 139 LBS | SYSTOLIC BLOOD PRESSURE: 104 MMHG | BODY MASS INDEX: 23.73 KG/M2 | OXYGEN SATURATION: 94 % | HEART RATE: 61 BPM | DIASTOLIC BLOOD PRESSURE: 59 MMHG | HEIGHT: 64 IN

## 2025-01-13 VITALS — HEART RATE: 68 BPM | OXYGEN SATURATION: 97 %

## 2025-01-13 DIAGNOSIS — I35.1 NONRHEUMATIC AORTIC (VALVE) INSUFFICIENCY: ICD-10-CM

## 2025-01-13 DIAGNOSIS — I65.29 OCCLUSION AND STENOSIS OF UNSPECIFIED CAROTID ARTERY: ICD-10-CM

## 2025-01-13 DIAGNOSIS — I25.10 ATHEROSCLEROTIC HEART DISEASE OF NATIVE CORONARY ARTERY W/OUT ANGINA PECTORIS: ICD-10-CM

## 2025-01-13 DIAGNOSIS — E78.5 HYPERLIPIDEMIA, UNSPECIFIED: ICD-10-CM

## 2025-01-13 PROCEDURE — 93000 ELECTROCARDIOGRAM COMPLETE: CPT

## 2025-01-13 PROCEDURE — 99214 OFFICE O/P EST MOD 30 MIN: CPT

## 2025-04-10 ENCOUNTER — APPOINTMENT (OUTPATIENT)
Dept: ULTRASOUND IMAGING | Facility: CLINIC | Age: 70
End: 2025-04-10
Payer: MEDICARE

## 2025-04-10 ENCOUNTER — OUTPATIENT (OUTPATIENT)
Dept: OUTPATIENT SERVICES | Facility: HOSPITAL | Age: 70
LOS: 1 days | End: 2025-04-10
Payer: MEDICARE

## 2025-04-10 DIAGNOSIS — R09.89 OTHER SPECIFIED SYMPTOMS AND SIGNS INVOLVING THE CIRCULATORY AND RESPIRATORY SYSTEMS: ICD-10-CM

## 2025-04-10 DIAGNOSIS — I65.21 OCCLUSION AND STENOSIS OF RIGHT CAROTID ARTERY: ICD-10-CM

## 2025-04-10 PROCEDURE — 93880 EXTRACRANIAL BILAT STUDY: CPT | Mod: 26

## 2025-04-10 PROCEDURE — 93880 EXTRACRANIAL BILAT STUDY: CPT

## 2025-04-16 ENCOUNTER — NON-APPOINTMENT (OUTPATIENT)
Age: 70
End: 2025-04-16

## 2025-04-16 DIAGNOSIS — I65.29 OCCLUSION AND STENOSIS OF UNSPECIFIED CAROTID ARTERY: ICD-10-CM

## 2025-04-28 ENCOUNTER — RX RENEWAL (OUTPATIENT)
Age: 70
End: 2025-04-28

## 2025-05-05 ENCOUNTER — RX RENEWAL (OUTPATIENT)
Age: 70
End: 2025-05-05

## 2025-05-23 ENCOUNTER — NON-APPOINTMENT (OUTPATIENT)
Age: 70
End: 2025-05-23

## 2025-05-23 ENCOUNTER — APPOINTMENT (OUTPATIENT)
Dept: CARDIOLOGY | Facility: CLINIC | Age: 70
End: 2025-05-23
Payer: MEDICARE

## 2025-05-23 ENCOUNTER — APPOINTMENT (OUTPATIENT)
Dept: CARDIOLOGY | Facility: CLINIC | Age: 70
End: 2025-05-23

## 2025-05-23 VITALS
HEART RATE: 60 BPM | SYSTOLIC BLOOD PRESSURE: 128 MMHG | DIASTOLIC BLOOD PRESSURE: 73 MMHG | OXYGEN SATURATION: 99 % | HEIGHT: 64 IN | WEIGHT: 139 LBS | BODY MASS INDEX: 23.73 KG/M2

## 2025-05-23 VITALS — SYSTOLIC BLOOD PRESSURE: 128 MMHG | DIASTOLIC BLOOD PRESSURE: 73 MMHG | HEART RATE: 60 BPM | OXYGEN SATURATION: 99 %

## 2025-05-23 VITALS — DIASTOLIC BLOOD PRESSURE: 71 MMHG | SYSTOLIC BLOOD PRESSURE: 116 MMHG

## 2025-05-23 DIAGNOSIS — I65.29 OCCLUSION AND STENOSIS OF UNSPECIFIED CAROTID ARTERY: ICD-10-CM

## 2025-05-23 DIAGNOSIS — I10 ESSENTIAL (PRIMARY) HYPERTENSION: ICD-10-CM

## 2025-05-23 DIAGNOSIS — E78.5 HYPERLIPIDEMIA, UNSPECIFIED: ICD-10-CM

## 2025-05-23 DIAGNOSIS — I25.10 ATHEROSCLEROTIC HEART DISEASE OF NATIVE CORONARY ARTERY W/OUT ANGINA PECTORIS: ICD-10-CM

## 2025-05-23 DIAGNOSIS — I65.21 OCCLUSION AND STENOSIS OF RIGHT CAROTID ARTERY: ICD-10-CM

## 2025-05-23 PROCEDURE — 93000 ELECTROCARDIOGRAM COMPLETE: CPT

## 2025-05-23 PROCEDURE — 99214 OFFICE O/P EST MOD 30 MIN: CPT

## 2025-05-23 PROCEDURE — 99214 OFFICE O/P EST MOD 30 MIN: CPT | Mod: NC

## 2025-06-25 ENCOUNTER — RX RENEWAL (OUTPATIENT)
Age: 70
End: 2025-06-25

## 2025-09-08 ENCOUNTER — NON-APPOINTMENT (OUTPATIENT)
Age: 70
End: 2025-09-08

## 2025-09-08 ENCOUNTER — APPOINTMENT (OUTPATIENT)
Dept: CARDIOLOGY | Facility: CLINIC | Age: 70
End: 2025-09-08
Payer: MEDICARE

## 2025-09-08 VITALS
HEIGHT: 64 IN | HEART RATE: 63 BPM | BODY MASS INDEX: 23.73 KG/M2 | WEIGHT: 139 LBS | DIASTOLIC BLOOD PRESSURE: 75 MMHG | OXYGEN SATURATION: 99 % | SYSTOLIC BLOOD PRESSURE: 118 MMHG

## 2025-09-08 DIAGNOSIS — I65.21 OCCLUSION AND STENOSIS OF RIGHT CAROTID ARTERY: ICD-10-CM

## 2025-09-08 DIAGNOSIS — R09.89 OTHER SPECIFIED SYMPTOMS AND SIGNS INVOLVING THE CIRCULATORY AND RESPIRATORY SYSTEMS: ICD-10-CM

## 2025-09-08 DIAGNOSIS — I25.10 ATHEROSCLEROTIC HEART DISEASE OF NATIVE CORONARY ARTERY W/OUT ANGINA PECTORIS: ICD-10-CM

## 2025-09-08 PROCEDURE — 93000 ELECTROCARDIOGRAM COMPLETE: CPT

## 2025-09-08 PROCEDURE — 99214 OFFICE O/P EST MOD 30 MIN: CPT
